# Patient Record
Sex: MALE | Race: WHITE | NOT HISPANIC OR LATINO | Employment: FULL TIME | URBAN - METROPOLITAN AREA
[De-identification: names, ages, dates, MRNs, and addresses within clinical notes are randomized per-mention and may not be internally consistent; named-entity substitution may affect disease eponyms.]

---

## 2017-05-10 ENCOUNTER — OFFICE VISIT (OUTPATIENT)
Dept: SURGERY | Facility: CLINIC | Age: 42
End: 2017-05-10
Payer: COMMERCIAL

## 2017-05-10 VITALS
OXYGEN SATURATION: 99 % | HEIGHT: 74 IN | HEART RATE: 89 BPM | SYSTOLIC BLOOD PRESSURE: 132 MMHG | WEIGHT: 250 LBS | DIASTOLIC BLOOD PRESSURE: 76 MMHG | BODY MASS INDEX: 32.08 KG/M2

## 2017-05-10 DIAGNOSIS — R22.9 LOCALIZED SUPERFICIAL SWELLING, MASS, OR LUMP: Primary | ICD-10-CM

## 2017-05-10 PROCEDURE — 99202 OFFICE O/P NEW SF 15 MIN: CPT | Performed by: SURGERY

## 2017-05-10 NOTE — MR AVS SNAPSHOT
"              After Visit Summary   5/10/2017    Wilbur Aguilar    MRN: [id number]           Patient Information     Date Of Birth          1975        Visit Information        Provider Department      5/10/2017 11:00 AM Damián Woods MD [facility] General Surgery      Today's Diagnoses     Localized superficial swelling, mass, or lump    -  1       Follow-ups after your visit        Your next 10 appointments already scheduled     May 24, 2017  9:00 AM CDT   PROCEDURE with Damián Woods MD, Laney Snowden PA-C,  Surg Cons Nurse,  SURG CONS PROCEDURE ROOM   [facility] ([facility])    [address]  [facility] [address] [phone]              Who to contact     If you have questions or need follow up information about today's clinic visit or your schedule please contact [facility] directly at [phone].  Normal or non-critical lab and imaging results will be communicated to you by Dumbstruckhart, letter or phone within 4 business days after the clinic has received the results. If you do not hear from us within 7 days, please contact the clinic through CorMatrixt or phone. If you have a critical or abnormal lab result, we will notify you by phone as soon as possible.  Submit refill requests through CHOOMOGO or call your pharmacy and they will forward the refill request to us. Please allow 3 business days for your refill to be completed.          Additional Information About Your Visit        CHOOMOGO Information     CHOOMOGO lets you send messages to your doctor, view your test results, renew your prescriptions, schedule appointments and more. To sign up, go to [email or web address] . Click on \"Log in\" on the left side of the screen, which will take you to the Welcome page. Then click on \"Sign up Now\" on the right side of the page.     You will be " "asked to enter the access code listed below, as well as some personal information. Please follow the directions to create your username and password.     Your access code is: [email or web address]  Expires: 2017  4:53 PM     Your access code will  in 90 days. If you need help or a new code, please call your [facility] or [phone].        Care EveryWhere ID     This is your Care EveryWhere ID. This could be used by other organizations to access your [place] medical records  [id number]        Your Vitals Were     Pulse Height Pulse Oximetry BMI (Body Mass Index)          89 6' 2\" (1.88 m) 99% 32.1 kg/m2         Blood Pressure from Last 3 Encounters:   05/10/17 132/76   16 112/86   14 118/70    Weight from Last 3 Encounters:   05/10/17 250 lb (113.4 kg)   16 262 lb 3.2 oz (118.9 kg)   14 252 lb 4.8 oz (114.4 kg)              Today, you had the following     No orders found for display       Primary Care Provider Office Phone # Fax #    Troy Flynn [phone]       [facility] [address] [place] [address]        Thank you!     Thank you for choosing [facility]  for your care. Our goal is always to provide you with excellent care. Hearing back from our patients is one way we can continue to improve our services. Please take a few minutes to complete the written survey that you may receive in the mail after your visit with us. Thank you!             Your Updated Medication List - Protect others around you: Learn how to safely use, store and throw away your medicines at www.disposemymeds.org.          This list is accurate as of: 5/10/17 11:59 PM.  Always use your most recent med list.                   Brand Name Dispense Instructions for use    MULTIVITAMIN TABS   OR      1 TABLET DAILY       VITAMIN E      daily         "

## 2017-05-10 NOTE — LETTER
"[facility]      Outpatient Consultation    May 10, 2017       Wilbur Aguilar MRN# [id number]   YOB: 1975 Age: 42 year old      Primary care provider: Troy Flynn     ASSESSMENT:   Wilbur Aguilar is an 42 year old year old male who I was asked to see by Lester Sandhu for scalp mass.     Progressively enlarging subcutaneous mass of scalp. Suspect sebaceous cyst.     RECOMMENDATIONS:   We discussed this. I think that this would be amenable to in office removal with local anesthesia if the patient were motivated for this. I would plan to send the lesion for testing although based on its slow growth and overall benign characteristics I certainly would not expect this to be anything concerning. He would like to move forward with this and we will schedule an office procedure for him.     Damián Woods MD  [phone] (l)     This note was created using voice recognition software. Undetected word substitutions or other errors may have occurred.        HPI:    Wilbur Aguilar is a 42 year old male who I was asked to see for a lesion on the scalp. He has noticed a slowly enlarging mass. He does not recall any drainage from it or any history of infection. It is not painful. He denies any injury to the area that he knows of. He states initially this is almost not even noticeable but now it is grown considerably, although slowly. It causes difficulty with hair cuts and grooming. There is no personal or family history of skin cancer or of sarcoma.        ALLERGIES:   No Known Allergies      REVIEW OF SYSTEMS:   10 point ROS neg other than the symptoms noted above in the HPI or here.       PHYSICAL EXAM:   /76 (Cuff Size: Adult Regular)  Pulse 89  Ht 6' 2\" (1.88 m)  Wt 250 lb (113.4 kg)  SpO2 99%  BMI 32.1 kg/m2 Estimated body mass index is 32.1 kg/(m^2) as calculated from the following:    Height as of this encounter: 6' 2\" (1.88 m).    Weight as of this " encounter: 250 lb (113.4 kg).   Constitutional: No acute distress  Eyes: Sclerae anicteric, moist conjunctivae; no lid-lag; PERRLA  ENT: Atraumatic; oropharynx clear with moist mucous membranes and no mucosal ulcerations; normal hard and soft palate  Neck: Supple neck without lymphadenopathy, no thyromegaly  Respiratory: Clear to auscultation bilaterally with normal respiratory effort  Cardiovascular: Regular rate and rhythm, no MRGs  GI: Soft, nontender, nondistended; no masses or HSM  Lymph/Hematologic: No pretibial edema  Genitourinary: Deferred  Skin: Normal temperature, turgor and texture; no rash, ulcers. Prominently on the central scalp there is an approximately 1.5 cm subcutaneous lesion which is firm but slightly compressible without any active or spontaneous drainage, cellulitis, or pain.  Musculoskeletal: Grossly symmetric and normal muscle bulk for age in all extremities; gait and station normal; no clubbing or cyanosis  Neurologic: CN II-XII grossly intact without deficits; sensation intact to light touch over all extremities  Neuropsychiatric: Appropriate affect, alert and oriented to person, place and time      Damián Woods MD

## 2017-05-23 NOTE — PROGRESS NOTES
[facility]     Outpatient Consultation     Wilbur Aguilar MRN# [id number]   YOB: 1975 Age: 42 year old     Primary care provider: Troy Flynn    ASSESSMENT:   Wilbur Aguilar is an 42 year old year old male who I was asked to see by Lester Sandhu for scalp mass.    Progressively enlarging subcutaneous mass of scalp. Suspect sebaceous cyst.    RECOMMENDATIONS:   We discussed this. I think that this would be amenable to in office removal with local anesthesia if the patient were motivated for this. I would plan to send the lesion for testing although based on its slow growth and overall benign characteristics I certainly would not expect this to be anything concerning. He would like to move forward with this and we will schedule an office procedure for him.    Damián Woods MD  [phone] (n)    This note was created using voice recognition software. Undetected word substitutions or other errors may have occurred.      HPI:    Wilbur Aguilar is a 42 year old male who I was asked to see for a lesion on the scalp. He has noticed a slowly enlarging mass. He does not recall any drainage from it or any history of infection. It is not painful. He denies any injury to the area that he knows of. He states initially this is almost not even noticeable but now it is grown considerably, although slowly. It causes difficulty with hair cuts and grooming. There is no personal or family history of skin cancer or of sarcoma.          PAST MEDICAL HISTORY:   History reviewed. No pertinent past medical history.     PAST SURGICAL HISTORY:     Past Surgical History:   Procedure Laterality Date     C NONSPECIFIC PROCEDURE      T + A as child        SOCIAL HISTORY:     Social History     Social History     Marital status:      Spouse name: Viry     Number of children: 3     Years of education: N/A     Occupational History      [employer]     Social History Main Topics  "    Smoking status: Former Smoker     Quit date: 1/1/2001     Smokeless tobacco: None      Comment: chews 1-2 cans every 3 days     Alcohol use Yes      Comment: rarely     Drug use: No     Sexual activity: Yes     Partners: Female     Other Topics Concern     None     Social History Narrative        FAMILY HISTORY:     Family History   Problem Relation Age of Onset     Breast Cancer Mother      CANCER Mother      lymphoma     Other Cancer Mother      DIABETES Father      adult onset type 2     CANCER Paternal Grandfather      Other Cancer Paternal Grandfather      DIABETES Paternal Grandmother      Hypertension Maternal Grandmother      Other Cancer Maternal Grandfather        MEDICATIONS:     Current Outpatient Prescriptions   Medication Sig Dispense Refill     MULTIVITAMIN TABS   OR 1 TABLET DAILY       VITAMIN E daily          ALLERGIES:   No Known Allergies     REVIEW OF SYSTEMS:   10 point ROS neg other than the symptoms noted above in the HPI or here.      PHYSICAL EXAM:   /76 (Cuff Size: Adult Regular)  Pulse 89  Ht 6' 2\" (1.88 m)  Wt 250 lb (113.4 kg)  SpO2 99%  BMI 32.1 kg/m2 Estimated body mass index is 32.1 kg/(m^2) as calculated from the following:    Height as of this encounter: 6' 2\" (1.88 m).    Weight as of this encounter: 250 lb (113.4 kg).   Constitutional: No acute distress  Eyes: Sclerae anicteric, moist conjunctivae; no lid-lag; PERRLA  ENT: Atraumatic; oropharynx clear with moist mucous membranes and no mucosal ulcerations; normal hard and soft palate  Neck: Supple neck without lymphadenopathy, no thyromegaly  Respiratory: Clear to auscultation bilaterally with normal respiratory effort  Cardiovascular: Regular rate and rhythm, no MRGs  GI: Soft, nontender, nondistended; no masses or HSM  Lymph/Hematologic: No pretibial edema  Genitourinary: Deferred  Skin: Normal temperature, turgor and texture; no rash, ulcers. Prominently on the central scalp there is an approximately 1.5 cm " subcutaneous lesion which is firm but slightly compressible without any active or spontaneous drainage, cellulitis, or pain.  Musculoskeletal: Grossly symmetric and normal muscle bulk for age in all extremities; gait and station normal; no clubbing or cyanosis  Neurologic: CN II-XII grossly intact without deficits; sensation intact to light touch over all extremities  Neuropsychiatric: Appropriate affect, alert and oriented to person, place and time     LABORATORY AND IMAGING:      Results for orders placed or performed in visit on 03/05/14   Lipid panel reflex to direct LDL   Result Value Ref Range    Cholesterol 129 <200 mg/dL    Triglycerides 64 0 - 150 mg/dL    HDL Cholesterol 41 >40 mg/dL    LDL Cholesterol Calculated 74 0 - 129 mg/dL    VLDL-Cholesterol 13 0 - 30 mg/dL    Cholesterol/HDL Ratio 3.1 0.0 - 5.0   Glucose   Result Value Ref Range    Glucose 101 (H) 60 - 99 mg/dL         30 minutes were spent in this encounter, over 50% in counseling and coordination of care.    Please route or send letter to:  Referring Provider

## 2017-05-24 ENCOUNTER — HOSPITAL ENCOUNTER (OUTPATIENT)
Dept: LAB | Facility: CLINIC | Age: 42
Discharge: HOME | End: 2017-05-24
Attending: SURGERY | Admitting: SURGERY
Payer: COMMERCIAL

## 2017-05-24 ENCOUNTER — OFFICE VISIT (OUTPATIENT)
Dept: SURGERY | Facility: CLINIC | Age: 42
End: 2017-05-24
Payer: COMMERCIAL

## 2017-05-24 DIAGNOSIS — L72.9 SCALP CYST: ICD-10-CM

## 2017-05-24 DIAGNOSIS — L72.9 SCALP CYST: Primary | ICD-10-CM

## 2017-05-24 PROCEDURE — 88304 TISSUE EXAM BY PATHOLOGIST: CPT | Performed by: SURGERY

## 2017-05-24 PROCEDURE — 88304 TISSUE EXAM BY PATHOLOGIST: CPT | Mod: 26 | Performed by: SURGERY

## 2017-05-24 PROCEDURE — 11422 EXC H-F-NK-SP B9+MARG 1.1-2: CPT | Performed by: SURGERY

## 2017-05-24 NOTE — PROGRESS NOTES
Procedure  Removal scalp cyst    After informed consent was obtained, the area of the scalp mass at the vertex was shaved and was then sterilely prepped. Gloves, masks, and a fenestrated drape were used. A transverse line overlying the mass was injected using 0.5% bupivacaine and this was then sharply opened to reveal a typical appearing sebaceous cyst. It was circumferentially dissected sharply. It was then delivered from the incision and handed off as specimen. There is a small amount of cyst wall that was adherent that had become  on the underside of one of the skin flaps and this was dissected free and was sent with the specimen. At each corner of the incision at the base there was a small area arterial bleeding. This was incompletely controlled with battery-powered cautery and so we used Hyfrecator for control. The incision was then washed and dried. It was closed with multiple interrupted 3-0 Vicryl deep dermal sutures followed by 4-0 Vicryl subcuticular stitch. Skin adhesive was applied. The patient tolerated this well. I was assisted by Laney Snowden PA-C.

## 2017-05-24 NOTE — MR AVS SNAPSHOT
"              After Visit Summary   5/24/2017    Wilbur Aguilar    MRN: [id number]           Patient Information     Date Of Birth          1975        Visit Information        Provider Department      5/24/2017 9:00 AM Nurse,  Surg Cons; Laney Snowden PA-C; Damián Woods MD; [facility] SURG CONS PROCEDURE ROOM [facility] General Surgery      Today's Diagnoses     Scalp cyst    -  1       Follow-ups after your visit        Future tests that were ordered for you today     Open Future Orders        Priority Expected Expires Ordered    Surgical pathology exam Routine 5/24/2017 5/25/2017 5/24/2017            Who to contact     If you have questions or need follow up information about today's clinic visit or your schedule please contact [facility] directly at [phone].  Normal or non-critical lab and imaging results will be communicated to you by Wavo.mehart, letter or phone within 4 business days after the clinic has received the results. If you do not hear from us within 7 days, please contact the clinic through [email or web address] or phone. If you have a critical or abnormal lab result, we will notify you by phone as soon as possible.  Submit refill requests through PassportParking or call your pharmacy and they will forward the refill request to us. Please allow 3 business days for your refill to be completed.          Additional Information About Your Visit        Wavo.mehart Information     PassportParking lets you send messages to your doctor, view your test results, renew your prescriptions, schedule appointments and more. To sign up, go to [email or web address] . Click on \"Log in\" on the left side of the screen, which will take you to the Welcome page. Then click on \"Sign up Now\" on the right side of the page.     You will be asked to enter the access code listed below, as well as some personal information. Please follow the directions to create your " username and password.     Your access code is: [email or web address]  Expires: 2017  4:53 PM     Your access code will  in 90 days. If you need help or a new code, please call your [facility] or [phone].        Care EveryWhere ID     This is your Care EveryWhere ID. This could be used by other organizations to access your [place] medical records  [id number]         Blood Pressure from Last 3 Encounters:   05/10/17 132/76   16 112/86   14 118/70    Weight from Last 3 Encounters:   05/10/17 250 lb (113.4 kg)   16 262 lb 3.2 oz (118.9 kg)   14 252 lb 4.8 oz (114.4 kg)               Primary Care Provider Office Phone # Fax #    Troy Flynn [phone]       [facility] [address] [place] [address]        Thank you!     Thank you for choosing [facility]  for your care. Our goal is always to provide you with excellent care. Hearing back from our patients is one way we can continue to improve our services. Please take a few minutes to complete the written survey that you may receive in the mail after your visit with us. Thank you!             Your Updated Medication List - Protect others around you: Learn how to safely use, store and throw away your medicines at www.disposemymeds.org.          This list is accurate as of: 17  2:26 PM.  Always use your most recent med list.                   Brand Name Dispense Instructions for use    MULTIVITAMIN TABS   OR      1 TABLET DAILY       VITAMIN E      daily

## 2017-05-25 LAB — COPATH REPORT: NORMAL

## 2019-05-17 NOTE — PATIENT INSTRUCTIONS
Stop your fish oil one week out from surgery (TOMORROW)      Before Your Surgery      Call your surgeon if there is any change in your health. This includes signs of a cold or flu (such as a sore throat, runny nose, cough, rash or fever).    Do not smoke, drink alcohol or take over the counter medicine (unless your surgeon or primary care doctor tells you to) for the 24 hours before and after surgery.    If you take prescribed drugs: Follow your doctor s orders about which medicines to take and which to stop until after surgery.    Eating and drinking prior to surgery: follow the instructions from your surgeon    Take a shower or bath the night before surgery. Use the soap your surgeon gave you to gently clean your skin. If you do not have soap from your surgeon, use your regular soap. Do not shave or scrub the surgery site.  Wear clean pajamas and have clean sheets on your bed.

## 2019-05-17 NOTE — PROGRESS NOTES
[facility]  [id number] [phone]  Dept: [phone]    PRE-OP EVALUATION:  Today's date: 2019    Wilbur Aguilar (: 1975) presents for pre-operative evaluation assessment as requested by Dr. Sina Lomax. He requires evaluation and anesthesia risk assessment prior to undergoing surgery/procedure for treatment of Left Knee Partial Medical Meniscectomy.    Fax number for surgical facility: [phone]  Primary Physician: [facility]  Type of Anesthesia Anticipated: General    Patient has a Health Care Directive or Living Will:  NO    Preop Questions 2019   Who is doing your surgery? Dr Sina Lomax    What are you having done? Left knee scope; left medial meniscus tear   Date of Surgery/Procedure: 19   Facility or Hospital where procedure/surgery will be performed: [facility]    1.  Do you have a history of Heart attack, stroke, stent, coronary bypass surgery, or other heart surgery? No   2.  Do you ever have any pain or discomfort in your chest? No   3.  Do you have a history of  Heart Failure? No   4.   Are you troubled by shortness of breath when:  walking on a level surface, or up a slight hill, or at night? No   5.  Do you currently have a cold, bronchitis or other respiratory infection? No   6.  Do you have a cough, shortness of breath, or wheezing? No   7.  Do you sometimes get pains in the calves of your legs when you walk? No   8. Do you or anyone in your family have previous history of blood clots? No   9.  Do you or does anyone in your family have a serious bleeding problem such as prolonged bleeding following surgeries or cuts? No   10. Have you ever had problems with anemia or been told to take iron pills? No   11. Have you had any abnormal blood loss such as black, tarry or bloody stools? No   12. Have you ever had a blood transfusion? No   13. Have you or any of your relatives ever had  problems with anesthesia? No   14. Do you have sleep apnea, excessive snoring or daytime drowsiness? No   15. Do you have any prosthetic heart valves? No   16. Do you have prosthetic joints? No         HPI:     HPI related to upcoming procedure: Patient is a 43yo male who noted a twist injury while working out and tore partial left meniscus. Happened in April. Did have some significant swelling. MRI showed partial tear of medial meniscus.      See problem list for active medical problems.  Problems all longstanding and stable, except as noted/documented.  See ROS for pertinent symptoms related to these conditions.                                                                                                                                                          .    MEDICAL HISTORY:     Patient Active Problem List    Diagnosis Date Noted     CARDIOVASCULAR SCREENING; LDL GOAL LESS THAN 160 10/31/2010     Priority: Medium      History reviewed. No pertinent past medical history.  Past Surgical History:   Procedure Laterality Date     C NONSPECIFIC PROCEDURE      T + A as child      Current Outpatient Medications   Medication Sig Dispense Refill     MULTIVITAMIN TABS   OR 1 TABLET DAILY       Omega-3 Fatty Acids (FISH OIL PO)        Probiotic Product (PROBIOTIC PO)        VITAMIN E daily       OTC products: MV, Fish oil and probiotic     No Known Allergies   Latex Allergy: NO    Social History     Tobacco Use     Smoking status: Former Smoker     Last attempt to quit: 2001     Years since quittin.3     Smokeless tobacco: Former User     Tobacco comment: chews 1-2 cans every 3 days   Substance Use Topics     Alcohol use: Yes     Comment: rarely     History   Drug Use No       REVIEW OF SYSTEMS:   Constitutional, neuro, ENT, endocrine, pulmonary, cardiac, gastrointestinal, genitourinary, musculoskeletal, integument and psychiatric systems are negative, except as otherwise noted.    EXAM:   /81   Pulse  "94   Temp 97  F (36.1  C) (Tympanic)   Resp 18   Ht 1.88 m (6' 2\")   Wt 113.2 kg (249 lb 8 oz)   SpO2 95%   BMI 32.03 kg/m      GENERAL APPEARANCE: healthy, alert and no distress     EYES: EOMI,  PERRL     HENT: ear canals and TM's normal and nose and mouth without ulcers or lesions     RESP: lungs clear to auscultation - no rales, rhonchi or wheezes     CV: regular rates and rhythm, normal S1 S2, no S3 or S4 and no murmur, click or rub     ABDOMEN:  soft, nontender, no HSM or masses and bowel sounds normal     MS: extremities normal- no gross deformities noted, no evidence of inflammation in joints, FROM in all extremities.     SKIN: no suspicious lesions or rashes     NEURO: Normal strength and tone, sensory exam grossly normal, mentation intact and speech normal     PSYCH: mentation appears normal. and affect normal/bright    DIAGNOSTICS:   No labs or EKG required for low risk surgery and NOT ON CHRONIC MEDICATIONS    IMPRESSION:   Reason for surgery/procedure: Medical meniscus tear, left  Diagnosis/reason for consult: Pre-operative consult    The proposed surgical procedure is considered LOW risk.    REVISED CARDIAC RISK INDEX  The patient has the following serious cardiovascular risks for perioperative complications such as (MI, PE, VFib and 3  AV Block):  No serious cardiac risks  INTERPRETATION: 0 risks: Class I (very low risk - 0.4% complication rate)    The patient has the following additional risks for perioperative complications:  No identified additional risks      ICD-10-CM    1. Preop general physical exam Z01.818    2. Tear of medial meniscus of left knee, current, unspecified tear type, initial encounter S83.242A        RECOMMENDATIONS:     --Consult hospital rounder / IM to assist post-op medical management    --Patient is on no chronic medications    --Will stop FISH OIL    APPROVAL GIVEN to proceed with proposed procedure, without further diagnostic evaluation       Signed Electronically by: " Lester Sandhu PA-C    Copy of this evaluation report is provided to requesting physician.    [place] Preop Guidelines    Revised Cardiac Risk Index

## 2019-05-21 ENCOUNTER — OFFICE VISIT (OUTPATIENT)
Dept: FAMILY MEDICINE | Facility: CLINIC | Age: 44
End: 2019-05-21
Payer: COMMERCIAL

## 2019-05-21 VITALS
BODY MASS INDEX: 32.02 KG/M2 | TEMPERATURE: 97 F | RESPIRATION RATE: 18 BRPM | OXYGEN SATURATION: 95 % | HEIGHT: 74 IN | DIASTOLIC BLOOD PRESSURE: 81 MMHG | WEIGHT: 249.5 LBS | SYSTOLIC BLOOD PRESSURE: 120 MMHG | HEART RATE: 94 BPM

## 2019-05-21 DIAGNOSIS — Z01.818 PREOP GENERAL PHYSICAL EXAM: Primary | ICD-10-CM

## 2019-05-21 DIAGNOSIS — S83.242A TEAR OF MEDIAL MENISCUS OF LEFT KNEE, CURRENT, UNSPECIFIED TEAR TYPE, INITIAL ENCOUNTER: ICD-10-CM

## 2019-05-21 PROCEDURE — 99214 OFFICE O/P EST MOD 30 MIN: CPT | Performed by: PHYSICIAN ASSISTANT

## 2019-05-21 ASSESSMENT — MIFFLIN-ST. JEOR: SCORE: 2081.47

## 2020-04-06 ENCOUNTER — TELEPHONE (OUTPATIENT)
Dept: FAMILY MEDICINE | Facility: CLINIC | Age: 45
End: 2020-04-06

## 2020-04-06 NOTE — TELEPHONE ENCOUNTER
Wife calls.  Pt has a rash on his ribs and side and wraps around to his back.  It is 6 cm long.  It has pus filled blisters.  Last night he got out of the shower and the area itches.  It does smart a little today.  Pt is at work today.      Advised he should have some kind of visit.  Transferred wife to scheduling so he can get set up for [employer] and do an e-visit and submit a photo.  Advised on how to do an e-visit.

## 2021-01-14 NOTE — PROGRESS NOTES
"Assessment and Plan    (B07.8) Common wart  (primary encounter diagnosis)  Comment: Verbal consent obtained.  Wart(s) debrided with #10 scalpel.  Cryo treatment x3 with 15sec thaw.  Patient tolerated procedure well.    Plan: DESTRUCT BENIGN LESION, UP TO 14            (Z23) Need for Tdap vaccination  Comment:   Plan: TD PRESERV FREE, IM (7+ YRS), ADMIN 1st VACCINE              RTC in 2w    Adriano Slater MD        Subjective     Wilbur is a 46 year old who presents to clinic today for the following health issues     HPI       Warts  Onset/Duration: almost 3 years   Description (location/number): left thumb   Accompanying signs and symptoms (pain, redness):  no   History: prior warts:  no   Therapies tried and outcome: compound W-seems to be helping a little bit.     Limited to pad of L thumb.  Tend to crack and bleed.  No previous issues with them.  Has been trying Compound W, not really helping.          Review of Systems   Constitutional: Negative.    Skin:        warts            Objective    /88 (BP Location: Right arm, Patient Position: Sitting, Cuff Size: Adult Regular)   Pulse 96   Temp 98.4  F (36.9  C) (Oral)   Resp 20   Ht 1.886 m (6' 2.25\")   Wt 119.2 kg (262 lb 12.8 oz)   BMI 33.51 kg/m    Body mass index is 33.51 kg/m .  Physical Exam  Nursing note reviewed.   Constitutional:       Appearance: Normal appearance.   Musculoskeletal:        Hands:    Neurological:      Mental Status: He is alert.                 "

## 2021-01-15 ENCOUNTER — OFFICE VISIT (OUTPATIENT)
Dept: FAMILY MEDICINE | Facility: CLINIC | Age: 46
End: 2021-01-15
Payer: COMMERCIAL

## 2021-01-15 VITALS
BODY MASS INDEX: 33.73 KG/M2 | DIASTOLIC BLOOD PRESSURE: 88 MMHG | WEIGHT: 262.8 LBS | HEIGHT: 74 IN | RESPIRATION RATE: 20 BRPM | TEMPERATURE: 98.4 F | SYSTOLIC BLOOD PRESSURE: 138 MMHG | HEART RATE: 96 BPM

## 2021-01-15 DIAGNOSIS — Z23 NEED FOR TDAP VACCINATION: ICD-10-CM

## 2021-01-15 DIAGNOSIS — B07.8 COMMON WART: Primary | ICD-10-CM

## 2021-01-15 PROCEDURE — 17110 DESTRUCTION B9 LES UP TO 14: CPT | Performed by: FAMILY MEDICINE

## 2021-01-15 PROCEDURE — 90471 IMMUNIZATION ADMIN: CPT | Performed by: FAMILY MEDICINE

## 2021-01-15 PROCEDURE — 90714 TD VACC NO PRESV 7 YRS+ IM: CPT | Performed by: FAMILY MEDICINE

## 2021-01-15 ASSESSMENT — ENCOUNTER SYMPTOMS
ROS SKIN COMMENTS: WARTS
CONSTITUTIONAL NEGATIVE: 1

## 2021-01-15 ASSESSMENT — MIFFLIN-ST. JEOR: SCORE: 2135.77

## 2021-01-15 NOTE — PROGRESS NOTES
Prior to immunization administration, verified patients identity using patient s name and date of birth. Please see Immunization Activity for additional information.     Screening Questionnaire for Adult Immunization    Are you sick today?   No   Do you have allergies to medications, food, a vaccine component or latex?   No   Have you ever had a serious reaction after receiving a vaccination?   No   Do you have a long-term health problem with heart, lung, kidney, or metabolic disease (e.g., diabetes), asthma, a blood disorder, no spleen, complement component deficiency, a cochlear implant, or a spinal fluid leak?  Are you on long-term aspirin therapy?   No   Do you have cancer, leukemia, HIV/AIDS, or any other immune system problem?   No   Do you have a parent, brother, or sister with an immune system problem?   No   In the past 3 months, have you taken medications that affect  your immune system, such as prednisone, other steroids, or anticancer drugs; drugs for the treatment of rheumatoid arthritis, Crohn s disease, or psoriasis; or have you had radiation treatments?   No   Have you had a seizure, or a brain or other nervous system problem?   No   During the past year, have you received a transfusion of blood or blood    products, or been given immune (gamma) globulin or antiviral drug?   No   For women: Are you pregnant or is there a chance you could become       pregnant during the next month?   No   Have you received any vaccinations in the past 4 weeks?   No     Immunization questionnaire answers were all negative.        Per orders of Dr. Slater, injection of Td given by Lisa A. Magill, CMA. Patient instructed to remain in clinic for 15 minutes afterwards, and to report any adverse reaction to me immediately.       Screening performed by Lisa A. Magill, CMA on 1/15/2021 at 9:43 AM.

## 2021-01-15 NOTE — NURSING NOTE
"Chief Complaint   Patient presents with     Wart     Initial /88 (BP Location: Right arm, Patient Position: Sitting, Cuff Size: Adult Regular)   Pulse 96   Temp 98.4  F (36.9  C) (Oral)   Resp 20   Ht 1.886 m (6' 2.25\")   Wt 119.2 kg (262 lb 12.8 oz)   BMI 33.51 kg/m   Estimated body mass index is 33.51 kg/m  as calculated from the following:    Height as of this encounter: 1.886 m (6' 2.25\").    Weight as of this encounter: 119.2 kg (262 lb 12.8 oz).  BP completed using cuff size regular long right arm    Lisa Magill, CMA    "

## 2021-01-28 ENCOUNTER — OFFICE VISIT (OUTPATIENT)
Dept: FAMILY MEDICINE | Facility: CLINIC | Age: 46
End: 2021-01-28
Payer: COMMERCIAL

## 2021-01-28 VITALS
RESPIRATION RATE: 16 BRPM | BODY MASS INDEX: 33.29 KG/M2 | SYSTOLIC BLOOD PRESSURE: 116 MMHG | DIASTOLIC BLOOD PRESSURE: 84 MMHG | WEIGHT: 261 LBS | HEART RATE: 88 BPM | OXYGEN SATURATION: 95 % | TEMPERATURE: 98.1 F

## 2021-01-28 DIAGNOSIS — B07.8 COMMON WART: Primary | ICD-10-CM

## 2021-01-28 PROCEDURE — 17110 DESTRUCTION B9 LES UP TO 14: CPT | Performed by: FAMILY MEDICINE

## 2021-01-28 ASSESSMENT — ENCOUNTER SYMPTOMS
CONSTITUTIONAL NEGATIVE: 1
ROS SKIN COMMENTS: WARTS

## 2021-01-28 NOTE — PROGRESS NOTES
Assessment and Plan    (B07.8) Common wart  (primary encounter diagnosis)  Comment: Verbal consent obtained.  Wart(s) debrided with #15 scalpel.  Cryo treatment x3 with 15sec thaw.  Patient tolerated procedure well.    Plan: DESTRUCT BENIGN LESION, UP TO 14              RTC in 2w    Adriano Slater MD        Jaimie Bowles is a 46 year old who presents to clinic today for the following health issues     HPI       Warts  Onset/Duration: almost 3 years  Description (location/number): left thumb  Accompanying signs and symptoms (pain, redness): feels like it did when it first started  History: prior warts: no  Therapies tried and outcome: liquid nitrogen and Compound W          Previously treated 1/15/21, feels it's getting better.     Review of Systems   Constitutional: Negative.    Skin:        warts            Objective    /84 (BP Location: Right arm, Patient Position: Sitting, Cuff Size: Adult Large)   Pulse 88   Temp 98.1  F (36.7  C) (Oral)   Resp 16   Wt 118.4 kg (261 lb)   SpO2 95%   BMI 33.29 kg/m    Body mass index is 33.29 kg/m .  Physical Exam

## 2021-02-05 ENCOUNTER — OFFICE VISIT (OUTPATIENT)
Dept: FAMILY MEDICINE | Facility: CLINIC | Age: 46
End: 2021-02-05
Payer: COMMERCIAL

## 2021-02-05 VITALS
SYSTOLIC BLOOD PRESSURE: 116 MMHG | DIASTOLIC BLOOD PRESSURE: 82 MMHG | OXYGEN SATURATION: 97 % | TEMPERATURE: 98.2 F | BODY MASS INDEX: 33.88 KG/M2 | HEIGHT: 74 IN | WEIGHT: 264 LBS | HEART RATE: 86 BPM | RESPIRATION RATE: 16 BRPM

## 2021-02-05 DIAGNOSIS — Z11.59 NEED FOR HEPATITIS C SCREENING TEST: ICD-10-CM

## 2021-02-05 DIAGNOSIS — Z00.00 ROUTINE GENERAL MEDICAL EXAMINATION AT A HEALTH CARE FACILITY: Primary | ICD-10-CM

## 2021-02-05 LAB
ANION GAP SERPL CALCULATED.3IONS-SCNC: 4 MMOL/L (ref 3–14)
BUN SERPL-MCNC: 19 MG/DL (ref 7–30)
CALCIUM SERPL-MCNC: 8.9 MG/DL (ref 8.5–10.1)
CHLORIDE SERPL-SCNC: 109 MMOL/L (ref 94–109)
CHOLEST SERPL-MCNC: 147 MG/DL
CO2 SERPL-SCNC: 27 MMOL/L (ref 20–32)
CREAT SERPL-MCNC: 0.94 MG/DL (ref 0.66–1.25)
GFR SERPL CREATININE-BSD FRML MDRD: >90 ML/MIN/{1.73_M2}
GLUCOSE SERPL-MCNC: 102 MG/DL (ref 70–99)
HCV AB SERPL QL IA: NONREACTIVE
HDLC SERPL-MCNC: 54 MG/DL
LDLC SERPL CALC-MCNC: 74 MG/DL
NONHDLC SERPL-MCNC: 93 MG/DL
POTASSIUM SERPL-SCNC: 4.4 MMOL/L (ref 3.4–5.3)
SODIUM SERPL-SCNC: 140 MMOL/L (ref 133–144)
TRIGL SERPL-MCNC: 97 MG/DL

## 2021-02-05 PROCEDURE — 99396 PREV VISIT EST AGE 40-64: CPT | Performed by: FAMILY MEDICINE

## 2021-02-05 PROCEDURE — 36415 COLL VENOUS BLD VENIPUNCTURE: CPT | Performed by: FAMILY MEDICINE

## 2021-02-05 PROCEDURE — 80061 LIPID PANEL: CPT | Performed by: FAMILY MEDICINE

## 2021-02-05 PROCEDURE — 86803 HEPATITIS C AB TEST: CPT | Performed by: FAMILY MEDICINE

## 2021-02-05 PROCEDURE — 80048 BASIC METABOLIC PNL TOTAL CA: CPT | Performed by: FAMILY MEDICINE

## 2021-02-05 ASSESSMENT — ENCOUNTER SYMPTOMS
HEMATURIA: 0
PALPITATIONS: 0
CONSTIPATION: 0
ARTHRALGIAS: 0
HEMATOCHEZIA: 0
SORE THROAT: 0
FEVER: 0
WEAKNESS: 0
COUGH: 0
NAUSEA: 0
DYSURIA: 0
MYALGIAS: 0
EYE PAIN: 0
PARESTHESIAS: 0
NERVOUS/ANXIOUS: 0
ABDOMINAL PAIN: 0
DIZZINESS: 0
CHILLS: 0
SHORTNESS OF BREATH: 0
HEADACHES: 0
JOINT SWELLING: 0
HEARTBURN: 0
FREQUENCY: 0
DIARRHEA: 0

## 2021-02-05 ASSESSMENT — MIFFLIN-ST. JEOR: SCORE: 2137.25

## 2021-02-05 NOTE — PATIENT INSTRUCTIONS
Preventive Health Recommendations  Male Ages 40 to 49    Yearly exam:             See your health care provider every year in order to  o   Review health changes.   o   Discuss preventive care.    o   Review your medicines if your doctor has prescribed any.    You should be tested each year for STDs (sexually transmitted diseases) if you re at risk.     Have a cholesterol test every 5 years.     Have a colonoscopy (test for colon cancer) if someone in your family has had colon cancer or polyps before age 50.     After age 45, have a diabetes test (fasting glucose). If you are at risk for diabetes, you should have this test every 3 years.      Talk with your health care provider about whether or not a prostate cancer screening test (PSA) is right for you.    Shots: Get a flu shot each year. Get a tetanus shot every 10 years.     Nutrition:    Eat at least 5 servings of fruits and vegetables daily.     Eat whole-grain bread, whole-wheat pasta and brown rice instead of white grains and rice.     Get adequate Calcium and Vitamin D.     Lifestyle    Exercise for at least 150 minutes a week (30 minutes a day, 5 days a week). This will help you control your weight and prevent disease.     Limit alcohol to one drink per day.     No smoking.     Wear sunscreen to prevent skin cancer.     See your dentist every six months for an exam and cleaning.

## 2021-02-05 NOTE — PROGRESS NOTES
SUBJECTIVE:   CC: Wilbur Aguilar is an 46 year old male who presents for preventative health visit.       Patient has been advised of split billing requirements and indicates understanding: Yes  Healthy Habits:     Getting at least 3 servings of Calcium per day:  Yes    Bi-annual eye exam:  Yes    Dental care twice a year:  Yes    Sleep apnea or symptoms of sleep apnea:  Excessive snoring    Diet:  Regular (no restrictions)    Frequency of exercise:  2-3 days/week    Duration of exercise:  15-30 minutes    Taking medications regularly:  Yes    Medication side effects:  None    PHQ-2 Total Score: 0    Additional concerns today:  No    Has been seen recently for wart on his thumb, feels this is essentially gone.      Is fasting.      Feeling well, no acute concerns.    Works at [employer], frozen/.    Today's PHQ-2 Score:   PHQ-2 (  [employer]) 2021   Q1: Little interest or pleasure in doing things 0   Q2: Feeling down, depressed or hopeless 0   PHQ-2 Score 0   Q1: Little interest or pleasure in doing things Not at all   Q2: Feeling down, depressed or hopeless Not at all   PHQ-2 Score 0       Abuse: Current or Past(Physical, Sexual or Emotional)- No  Do you feel safe in your environment? Yes        Social History     Tobacco Use     Smoking status: Former Smoker     Quit date: 2001     Years since quittin.1     Smokeless tobacco: Former User     Tobacco comment: chews 1-2 cans every 3 days   Substance Use Topics     Alcohol use: Yes     Comment: rarely     If you drink alcohol do you typically have >3 drinks per day or >7 drinks per week? No    Alcohol Use 2021   Prescreen: >3 drinks/day or >7 drinks/week? No   Prescreen: >3 drinks/day or >7 drinks/week? -       Last PSA: No results found for: PSA    Reviewed orders with patient. Reviewed health maintenance and updated orders accordingly - Yes  Lab work is in process  Labs reviewed in EPIC    Reviewed and updated as needed this visit by  "clinical staff  Tobacco  Allergies  Meds   Med Hx  Surg Hx  Fam Hx  Soc Hx        Reviewed and updated as needed this visit by Provider  Tobacco                   Review of Systems   Constitutional: Negative for chills and fever.   HENT: Negative for congestion, ear pain, hearing loss and sore throat.    Eyes: Negative for pain and visual disturbance.   Respiratory: Negative for cough and shortness of breath.    Cardiovascular: Negative for chest pain, palpitations and peripheral edema.   Gastrointestinal: Negative for abdominal pain, constipation, diarrhea, heartburn, hematochezia and nausea.   Genitourinary: Negative for discharge, dysuria, frequency, genital sores, hematuria, impotence and urgency.   Musculoskeletal: Negative for arthralgias, joint swelling and myalgias.   Skin: Negative for rash.   Neurological: Negative for dizziness, weakness, headaches and paresthesias.   Psychiatric/Behavioral: Negative for mood changes. The patient is not nervous/anxious.          OBJECTIVE:   /82 (BP Location: Right arm, Patient Position: Sitting, Cuff Size: Adult Large)   Pulse 86   Temp 98.2  F (36.8  C) (Oral)   Resp 16   Ht 1.88 m (6' 2\")   Wt 119.7 kg (264 lb)   SpO2 97%   BMI 33.90 kg/m      Physical Exam  Vitals signs and nursing note reviewed.   Constitutional:       Appearance: He is well-developed.   HENT:      Head: Normocephalic and atraumatic.      Right Ear: Tympanic membrane, ear canal and external ear normal.      Left Ear: Tympanic membrane, ear canal and external ear normal.   Eyes:      Pupils: Pupils are equal, round, and reactive to light.   Neck:      Thyroid: No thyromegaly.   Cardiovascular:      Rate and Rhythm: Normal rate and regular rhythm.      Heart sounds: Normal heart sounds.   Pulmonary:      Effort: Pulmonary effort is normal.      Breath sounds: Normal breath sounds.   Abdominal:      General: Bowel sounds are normal.      Palpations: Abdomen is soft. There is no mass. " "  Musculoskeletal: Normal range of motion.   Lymphadenopathy:      Cervical: No cervical adenopathy.   Skin:     General: Skin is warm and dry.      Findings: No rash.   Neurological:      Mental Status: He is alert and oriented to person, place, and time.   Psychiatric:         Judgment: Judgment normal.           Diagnostic Test Results:  Labs reviewed in Epic    ASSESSMENT/PLAN:       ICD-10-CM    1. Routine general medical examination at a health care facility  Z00.00 Lipid panel reflex to direct LDL Fasting     Basic metabolic panel  (Ca, Cl, CO2, Creat, Gluc, K, Na, BUN)   2. Need for hepatitis C screening test  Z11.59 Hepatitis C Screen Reflex to HCV RNA Quant and Genotype       Patient has been advised of split billing requirements and indicates understanding: Yes  COUNSELING:   Reviewed preventive health counseling, as reflected in patient instructions       Regular exercise       Healthy diet/nutrition       Vision screening    Estimated body mass index is 33.9 kg/m  as calculated from the following:    Height as of this encounter: 1.88 m (6' 2\").    Weight as of this encounter: 119.7 kg (264 lb).     Weight management plan: Discussed healthy diet and exercise guidelines    He reports that he quit smoking about 20 years ago. He has quit using smokeless tobacco.      Counseling Resources:  ATP IV Guidelines  Pooled Cohorts Equation Calculator  FRAX Risk Assessment  ICSI Preventive Guidelines  Dietary Guidelines for Americans, 2010  USDA's MyPlate  ASA Prophylaxis  Lung CA Screening    Adriano Slater MD  [facility]  "

## 2021-02-05 NOTE — PROGRESS NOTES
"SUBJECTIVE:   Wilbur Aguilar is a 46 year old male who presents for Preventive Visit.    {Split Bill scripting  The purpose of this visit is to discuss your medical history and prevent health problems before you are sick. You may be responsible for a co-pay, coinsurance, or deductible if your visit today includes services such as checking on a sore throat, having an x-ray or lab test, or treating and evaluating a new or existing condition :708185}  Patient has been advised of split billing requirements and indicates understanding: {Yes and No:297764}   Are you in the first 12 months of your Medicare coverage?  { :462955::\"No\"}    HPI  Do you feel safe in your environment? { :851682}    Have you ever done Advance Care Planning? (For example, a Health Directive, POLST, or a discussion with a medical provider or your loved ones about your wishes): { :526719}    {Hearing Test Done (Optional):834658}   Fall risk  { :207659}  {If any of the above assessments are answered yes, consider ordering appropriate referrals (Optional):743754::\"click delete button to remove this line now\"}  Cognitive Screening { :604031}    {Do you have sleep apnea, excessive snoring or daytime drowsiness? (Optional):441250}    Reviewed and updated as needed this visit by clinical staff                 Reviewed and updated as needed this visit by Provider                Social History     Tobacco Use     Smoking status: Former Smoker     Quit date: 2001     Years since quittin.1     Smokeless tobacco: Former User     Tobacco comment: chews 1-2 cans every 3 days   Substance Use Topics     Alcohol use: Yes     Comment: rarely     {Rooming Staff- Complete this question if Prescreen response is not shown below for today's visit. If you drink alcohol do you typically have >3 drinks per day or >7 drinks per week? (Optional):446435}    No flowsheet data found.{add AUDIT responses (Optional) (A score of 7 for adult men is an indication of " "hazardous drinking; a score of 8 or more is an indication of an alcohol use disorder.  A score of 7 or more for adult women is an indication of hazardous drinking or an alchohol use disorder):618818}    {Outside tests to abstract? :128744}    {additional problems to add (Optional):312928}    Current providers sharing in care for this patient include: {Rooming staff:  Please update Care Team in Rooming Activity, refresh this note and then delete this statement}  Patient Care Team:  Maci Cruz as PCP - Lester Ortiz PA-C as Assigned PCP    The following health maintenance items are reviewed in Epic and correct as of today:  Health Maintenance   Topic Date Due     HIV SCREENING  1988     HEPATITIS C SCREENING  1991     LIPID  2019     ANNUAL REVIEW OF HM ORDERS  01/15/2022     PREVENTIVE CARE VISIT  2022     DTAP/TDAP/TD IMMUNIZATION (4 - Td) 01/15/2031     PHQ-2  Completed     INFLUENZA VACCINE  Addressed     Pneumococcal Vaccine: Pediatrics (0 to 5 Years) and At-Risk Patients (6 to 64 Years)  Aged Out     IPV IMMUNIZATION  Aged Out     MENINGITIS IMMUNIZATION  Aged Out     HEPATITIS B IMMUNIZATION  Aged Out     {Chronicprobdata (optional):206756}  {Mammo Decision Support :470278}    Review of Systems  {ROS COMP (Optional):138947}    OBJECTIVE:   There were no vitals taken for this visit. Estimated body mass index is 33.29 kg/m  as calculated from the following:    Height as of 1/15/21: 1.886 m (6' 2.25\").    Weight as of 21: 118.4 kg (261 lb).  Physical Exam  {Exam (Optional) :070784}    {Diagnostic Test Results (Optional):414306::\"Diagnostic Test Results:\",\"Labs reviewed in Epic\"}    ASSESSMENT / PLAN:   {Diag Picklist:921040}    Patient has been advised of split billing requirements and indicates understanding: {YES / NO:470465::\"Yes\"}  COUNSELING:  {Medicare Counselin}    Estimated body mass index is 33.29 kg/m  as calculated from the " "following:    Height as of 1/15/21: 1.886 m (6' 2.25\").    Weight as of 1/28/21: 118.4 kg (261 lb).    {Weight Management Plan (ACO) Complete if BMI is abnormal-  Ages 18-64  BMI >24.9.  Age 65+ with BMI <23 or >30 (Optional):253629}    He reports that he quit smoking about 20 years ago. He has quit using smokeless tobacco.      Appropriate preventive services were discussed with this patient, including applicable screening as appropriate for cardiovascular disease, diabetes, osteopenia/osteoporosis, and glaucoma.  As appropriate for age/gender, discussed screening for colorectal cancer, prostate cancer, breast cancer, and cervical cancer. Checklist reviewing preventive services available has been given to the patient.    Reviewed patients plan of care and provided an AVS. The {CarePlan:140875} for Wilbur meets the Care Plan requirement. This Care Plan has been established and reviewed with the {PATIENT, FAMILY MEMBER, CAREGIVER:538798}.    Counseling Resources:  ATP IV Guidelines  Pooled Cohorts Equation Calculator  Breast Cancer Risk Calculator  Breast Cancer: Medication to Reduce Risk  FRAX Risk Assessment  ICSI Preventive Guidelines  Dietary Guidelines for Americans, 2010  [employer]'s MyPlate  ASA Prophylaxis  Lung CA Screening    Adriano Slater MD  [facility]    Identified Health Risks:  "

## 2021-02-18 ENCOUNTER — OFFICE VISIT (OUTPATIENT)
Dept: FAMILY MEDICINE | Facility: CLINIC | Age: 46
End: 2021-02-18
Payer: COMMERCIAL

## 2021-02-18 VITALS
DIASTOLIC BLOOD PRESSURE: 84 MMHG | SYSTOLIC BLOOD PRESSURE: 116 MMHG | BODY MASS INDEX: 34.28 KG/M2 | WEIGHT: 267 LBS | TEMPERATURE: 97.6 F | HEART RATE: 94 BPM | OXYGEN SATURATION: 94 % | RESPIRATION RATE: 20 BRPM

## 2021-02-18 DIAGNOSIS — B07.8 COMMON WART: Primary | ICD-10-CM

## 2021-02-18 PROCEDURE — 17110 DESTRUCTION B9 LES UP TO 14: CPT | Performed by: FAMILY MEDICINE

## 2021-02-18 NOTE — PROGRESS NOTES
Assessment and Plan    (B07.8) Common wart  (primary encounter diagnosis)  Comment: Verbal consent obtained.  Wart(s) debrided with #10 scalpel.  Cryo treatment x3 with 15sec thaw.  Patient tolerated procedure well.    Plan: DESTRUCT BENIGN LESION, UP TO 14              RTC in 2w prn    Adriano Slater MD        Jaimie Bowles is a 46 year old who presents for the following health issues     HPI       Warts  Onset/Duration: 3 years  Description (location/number): left thumb  Accompanying signs and symptoms (pain, redness):  no   History: prior warts:  no   Therapies tried and outcome: liquid nitrogen and Compound W              Review of Systems   Wart      Objective    /84 (BP Location: Right arm, Patient Position: Sitting, Cuff Size: Adult Large)   Pulse 94   Temp 97.6  F (36.4  C) (Oral)   Resp 20   Wt 121.1 kg (267 lb)   SpO2 94%   BMI 34.28 kg/m    Body mass index is 34.28 kg/m .  Physical Exam   Common wart, pad of L thumb

## 2021-04-16 ENCOUNTER — VIRTUAL VISIT (OUTPATIENT)
Dept: URGENT CARE | Facility: CLINIC | Age: 46
End: 2021-04-16
Payer: COMMERCIAL

## 2021-04-16 ENCOUNTER — TELEPHONE (OUTPATIENT)
Dept: FAMILY MEDICINE | Facility: CLINIC | Age: 46
End: 2021-04-16

## 2021-04-16 DIAGNOSIS — G47.00 INSOMNIA, UNSPECIFIED TYPE: Primary | ICD-10-CM

## 2021-04-16 PROCEDURE — 99213 OFFICE O/P EST LOW 20 MIN: CPT | Mod: 95 | Performed by: NURSE PRACTITIONER

## 2021-04-16 NOTE — TELEPHONE ENCOUNTER
Has not had a fever since Sunday.     I can not sleep and I am not hungry.     Brain fog    Had not slept well last night. I am not sleeping and it is really tough- for 4-5 days.       Scheduled appt.     Hazel Gambino RN on 4/16/2021 at 2:22 PM

## 2021-04-16 NOTE — PROGRESS NOTES
"  Wilbur Aguilar is a 46 year old male who is being evaluated via a billable telephone visit.      The patient has been notified of following:     \"This telephone visit will be conducted via a call between you and your physician/provider. We have found that certain health care needs can be provided without the need for a physical exam.  This service lets us provide the care you need with a short phone conversation.  If a prescription is necessary we can send it directly to your pharmacy.  If lab work is needed we can place an order for that and you can then stop by our lab to have the test done at a later time.    If during the course of the call the physician/provider feels a telephone visit is not appropriate, you will not be charged for this service.\"     Patient has given verbal consent for Telephone visit?  Yes       ASSESSMENT:       ICD-10-CM    1. Insomnia, unspecified type  G47.00          Worrisome symptoms discussed with instructions to go to the ED.  Patient verbalized understanding and agreed with this plan.  Chief Complaint:    Chief Complaint   Patient presents with     Insomnia       HPI: Wilbur Aguilar is an 46 year old male who calls with concerns that include poor sleep for the last 4 nights. Diagnosed with COVID over a week ago, off quarantine tomorrow. He is having difficulty sleeping, falling asleep and staying asleep. He tried a benadryl 25mg one night which helped some but he still awoke a few times.  He is feeling very stressed for a number of reasons and when he wakes up his mind tends to be racing about all the stressors.      ROS:      A 10 point ROS is negative except as expressed in HPI.    Past Medical History  History reviewed. No pertinent past medical history.      Allergies:  No Known Allergies     Current Meds:    Current Outpatient Medications:      MULTIVITAMIN TABS   OR, 1 TABLET DAILY, Disp: , Rfl:      Omega-3 Fatty Acids (FISH OIL PO), , Disp: , Rfl:      Probiotic " Product (PROBIOTIC PO), , Disp: , Rfl:      VITAMIN E, daily, Disp: , Rfl:      PHYSICAL EXAM:     Patient is alert and oriented. Able to speak in full sentences. No wheezing or cough heard during telephone conversation. Mood is appropriate.     Gunjan Mercado CNP  4/16/2021, 10:53 AM      Phone call duration:  22 minutes

## 2021-04-16 NOTE — PATIENT INSTRUCTIONS
Plain Unisom per package instructions or Benadryl (50mg) at bedtime.    Melatonin up to 10mg at bedtime.    Could try pseudoephedrine during the day for nasal congestion, if it makes you jittery don't take at night.    Patient Education     Treating Insomnia     Learning to relax before bedtime can improve your sleep.   Good sleeping habits are a key part of treatment. If needed, some medicines may help you sleep better at first. Making healthy lifestyle changes and learning to relax can improve your sleep. Treating insomnia takes commitment. But trust that your efforts will pay off. Be sure to talk with your healthcare provider before taking any medicine.  Healthy lifestyle  Your lifestyle affects your health and your sleep. Here are some healthy habits:    Keep a regular sleep schedule. Go to bed and get up at the same time each day.    Exercise regularly. It may help you reduce stress. Avoid strenuous exercise for 2 to 4 hours before bedtime.    Avoid or limit naps, especially in the late afternoon.    Use your bed only for sleep and sex.    Don t spend too much time in bed trying to fall asleep. If you can t fall asleep, get up and do something (no electronics) until you become tired and drowsy.    Avoid or limit caffeine and nicotine for up to 6 hours before bedtime. They can keep you awake at night.     Also avoid alcohol for at least 4 to 6 hours before bedtime. It may help you fall asleep at first. But you will have more awakenings during the night. And your sleep will not be restful.  Before bedtime  To sleep better every night, try these tips:    Have a bedtime routine to let your body and mind know when it s time to sleep.    Think of going to bed as relaxing and enjoyable. Sleep will come sooner.    If your worries don t let you sleep, write them down in a diary. Then close it, and go to bed.    Make sure the room is not too hot or too cold. If it s not dark enough, an eye mask can help. If it s noisy,  try using earplugs.    Don't eat a large meal just before bedtime.    Remove noises, bright lights, TVs, cell phones, and computers from your sleeping environment.    Use a comfortable mattress and pillow.  Learn to relax  Stress, anxiety, and body tension may keep you awake at night. To unwind before bedtime, try a warm bath, meditation, or yoga. Also try the following:    Deep breathing. Sit or lie back in a chair. Take a slow, deep breath. Hold it for 5 counts. Then breathe out slowly through your mouth. Keep doing this until you feel relaxed.    Progressive muscle relaxation. Tense and then relax the muscles in your body as you breathe deeply. Start with your feet and work up your body to your neck and face.  Cognitive Behavioral Therapy (CBT)  CBT is the most effective treatment for long-term insomnia. It tries to address the underlying causes of your sleep problems, including your habits and how you think about sleep.  Individual Therapy  Richard Trinidad PsyD,   Insomnia   [facility]    [facility]: [phone]    [facility]: [phone]  [facility]  Visit [email or web address] or call [phone] to find a clinic close to you.  Suggested resources  The resources below may help you relax. This list is for information only. [facility] is not responsible for the quality of services or the actions of any person or organization. There may be a fee to use some of these resources.  Insomnia treatment books  Landen Mind by Pushpa Becerra and Leia Boyle (2013)  Overcoming Insomnia by Rusty Batista and Pushpa Becerra (2008)  Quiet Your Mind and Get to Sleep: Solutions to Insomnia for Those with Depression, Anxiety or Chronic Pain by Pushpa Boyle (2009)  No More Sleepless Nights by Keaton Michele and Sierra Fisher (1996)  Say Landen to Insomnia by Derick Cm (2009)  The Insomnia  Workbook by Yvette Curry and Luciano Elias (2009)  The Insomnia Answer by Lorenzo Corona and Brian Bacon (2006)  Stress management and relaxation books  The Relaxation and Stress Reduction Workbook by Teressa Krishna, Alejandrina Kelsey and Josiah Huerta (2008)  Stress Management Workbook: Techniques and Self-Assessment Procedures by Colette Delvalle and Smooth Walls (1997)  A Mindfulness-Based Stress Reduction Workbook by Vince Mcelroy and Demetra Peguero (2010)  The Complete Stress Management Workbook by Iban Ward, Eldon Booker and Theo Mckeon (1996)  Online programs  www.SHUTi.me (pronounced shut eye). There is a fee for this program.   www.sleepIO.com (pronounced sleep ee oh). There is a fee for this program.  Other online resources  Deep breathing and progressive muscle relaxation (PMR):    http://www.Brammo.WAKU WAKU ????  Meditation:    www.AcceleCare Wound CentersranMaharana Infrastructure and Professional Services Private Limited (MIPS)    www.Dove Innovation and ManagementguidedApartment AddameditationApartment Addasite.WAKU WAKU ????  Guided imagery:    http://www.[employer]umin[employer]    http://Partigi.WAKU WAKU ????  (click on  Resource Library,  then choose  Meditation, Relaxation, Guided Imagery )  Apps for your mobile device:    Autogenic Training Progressive Muscle Relaxation by ICAgen Stephanie    Calm: Meditation and Sleep Stories by [employer]    Oculus VR Timer - Meditation Justyna by Q Chip.  This is not a prescription and these resources are optional. You must pay for any costs when using these resources. Please ask your insurance carrier if you can be reimbursed for these resources. If so, you are responsible for sending the needed details to your insurance carrier. These resources may also be tax deductible as medical expenses. Check with your .  Date Last Reviewed: 5/18/2018  Clinically reviewed by Richard Trinidad PsyD, RODRÍGUEZ, CBSM, Director of the Insomnia and Behavioral Sleep Health Program, [facility].  For informational purposes only. Not to replace the advice of your health  care provider.  Copyright   2018 [facility]. All rights reserved.

## 2021-04-27 NOTE — PROGRESS NOTES
Assessment & Plan     Anxiety  Insomnia, unspecified type  Acute reaction to stress  Suspect stress reaction and anxiety after recent COVID illness. He denies anxiety or sleep issues prior to COVID but since has had a hard time sleeping, along with significant anxiety about sleep. Plan to trial hydroxyzine. PHQ and SHA elevated today but given no previous history of anxiety or depression will try hydroxyzine and monitor. Follow-up if persistent or worsening symptoms, consider counseling or other treatment if needed. Risks and benefits of treatment plan discussed. Patient and/or parent acknowledges and agrees with plan of care, all questions answered.    - hydrOXYzine (ATARAX) 25 MG tablet; Take 1-2 tablets (25-50 mg) by mouth 3 times daily as needed for anxiety    Return in about 10 months (around 2/28/2022) for Preventive Physical Exam.    CAROLYN Reed [facility] Bowles is a 46 year old who presents for the following health issues     HPI     Sleeping trouble  Onset/Duration: 1 month  Description:   Frequency of sleep trouble:  nightly  Time to fall asleep (sleep latency): 30 minutes  Middle of night awakening:  YES  Early morning awakening:  YES  Progression of Symptoms:  same  Accompanying Signs & Symptoms:  Daytime sleepiness/napping: YES - sleepiness, no napping  Excessive snoring/apnea: YES - snoring  Restless legs: no  Waking to urinate: no  Chronic pain:  no  Depression symptoms (if yes, do PHQ9): no  Anxiety symptoms (if yes, do SHA-7): YES  History:  Prior Insomnia: no  New stressful situation: getting Covid 1 month ago  Precipitating factors:   Caffeine intake: YES- coffee in the morning  OTC decongestants: YES  Any new medications: no  Alleviating factors:  Self medicating (alcohol, etc.):  no  Stress-reduction (exercise, yoga, meditation etc): no  Therapies tried and outcome: Melatonin, Unisom  -  Somewhat effective    Trouble sleeping since COVID  "diagnosis about 1 month ago.  Feeling really anxious since then, especially about sleep. No history of anxiety or depression.  Seems like cycle of poor sleep which worsens anxiety, which then in turn causes worse sleep.  Was sleeping well prior to COVID diagnosis.  States that he felt \"closed in\" and \"violated\" because of COVID diagnosis and having to remain in his room for several days to avoid infecting his family.  Last night, he was able to calm down, play cards with his wife, and slept well.   No HI/SI  He has tried melatonin and ashwaganda which helped some    PHQ 4/28/2021   PHQ-9 Total Score 10   Q9: Thoughts of better off dead/self-harm past 2 weeks Not at all     SHA-7 SCORE 4/28/2021   Total Score 14         Review of Systems   Constitutional, HEENT, cardiovascular, pulmonary, gi and gu systems are negative, except as otherwise noted.      Objective    /84   Pulse 92   Temp 98.2  F (36.8  C) (Oral)   Resp 18   Wt 115.8 kg (255 lb 6.4 oz)   SpO2 97%   BMI 32.79 kg/m    Body mass index is 32.79 kg/m .  Physical Exam   GENERAL: healthy, alert and no distress  NECK: no adenopathy, no asymmetry, masses, or scars and thyroid normal to palpation  RESP: lungs clear to auscultation - no rales, rhonchi or wheezes  CV: regular rate and rhythm, normal S1 S2, no S3 or S4, no murmur, click or rub, no peripheral edema and peripheral pulses strong  NEURO: Normal strength and tone, mentation intact and speech normal  PSYCH: mentation appears normal, affect normal/bright    "

## 2021-04-28 ENCOUNTER — OFFICE VISIT (OUTPATIENT)
Dept: FAMILY MEDICINE | Facility: CLINIC | Age: 46
End: 2021-04-28
Payer: COMMERCIAL

## 2021-04-28 VITALS
SYSTOLIC BLOOD PRESSURE: 122 MMHG | HEART RATE: 92 BPM | RESPIRATION RATE: 18 BRPM | OXYGEN SATURATION: 97 % | DIASTOLIC BLOOD PRESSURE: 84 MMHG | WEIGHT: 255.4 LBS | BODY MASS INDEX: 32.79 KG/M2 | TEMPERATURE: 98.2 F

## 2021-04-28 DIAGNOSIS — F43.0 ACUTE REACTION TO STRESS: ICD-10-CM

## 2021-04-28 DIAGNOSIS — F41.9 ANXIETY: Primary | ICD-10-CM

## 2021-04-28 DIAGNOSIS — G47.00 INSOMNIA, UNSPECIFIED TYPE: ICD-10-CM

## 2021-04-28 PROCEDURE — 99214 OFFICE O/P EST MOD 30 MIN: CPT | Performed by: PHYSICIAN ASSISTANT

## 2021-04-28 RX ORDER — HYDROXYZINE HYDROCHLORIDE 25 MG/1
25-50 TABLET, FILM COATED ORAL 3 TIMES DAILY PRN
Qty: 30 TABLET | Refills: 1 | Status: SHIPPED | OUTPATIENT
Start: 2021-04-28 | End: 2024-02-02 | Stop reason: SINTOL

## 2021-04-28 ASSESSMENT — ANXIETY QUESTIONNAIRES
2. NOT BEING ABLE TO STOP OR CONTROL WORRYING: MORE THAN HALF THE DAYS
1. FEELING NERVOUS, ANXIOUS, OR ON EDGE: MORE THAN HALF THE DAYS
3. WORRYING TOO MUCH ABOUT DIFFERENT THINGS: MORE THAN HALF THE DAYS
IF YOU CHECKED OFF ANY PROBLEMS ON THIS QUESTIONNAIRE, HOW DIFFICULT HAVE THESE PROBLEMS MADE IT FOR YOU TO DO YOUR WORK, TAKE CARE OF THINGS AT HOME, OR GET ALONG WITH OTHER PEOPLE: SOMEWHAT DIFFICULT
5. BEING SO RESTLESS THAT IT IS HARD TO SIT STILL: MORE THAN HALF THE DAYS
GAD7 TOTAL SCORE: 14
6. BECOMING EASILY ANNOYED OR IRRITABLE: MORE THAN HALF THE DAYS
7. FEELING AFRAID AS IF SOMETHING AWFUL MIGHT HAPPEN: MORE THAN HALF THE DAYS

## 2021-04-28 ASSESSMENT — PATIENT HEALTH QUESTIONNAIRE - PHQ9
5. POOR APPETITE OR OVEREATING: MORE THAN HALF THE DAYS
SUM OF ALL RESPONSES TO PHQ QUESTIONS 1-9: 10

## 2021-04-28 NOTE — PATIENT INSTRUCTIONS
Plan to try hydroxyzine before bed to see if this helps with anxiety and sleep. I suspect this is a stress reaction and should improve with time. Follow-up if persistent or worsening symptoms, consider counseling or other treatment if needed.

## 2021-04-29 ASSESSMENT — ANXIETY QUESTIONNAIRES: GAD7 TOTAL SCORE: 14

## 2021-05-01 ENCOUNTER — MYC MEDICAL ADVICE (OUTPATIENT)
Dept: FAMILY MEDICINE | Facility: CLINIC | Age: 46
End: 2021-05-01

## 2021-05-03 ENCOUNTER — MYC MEDICAL ADVICE (OUTPATIENT)
Dept: FAMILY MEDICINE | Facility: CLINIC | Age: 46
End: 2021-05-03

## 2021-05-03 DIAGNOSIS — F43.0 ACUTE REACTION TO STRESS: ICD-10-CM

## 2021-05-03 DIAGNOSIS — G47.00 INSOMNIA, UNSPECIFIED TYPE: ICD-10-CM

## 2021-05-03 DIAGNOSIS — F41.9 ANXIETY: Primary | ICD-10-CM

## 2021-05-03 RX ORDER — TRAZODONE HYDROCHLORIDE 50 MG/1
50-100 TABLET, FILM COATED ORAL AT BEDTIME
Qty: 60 TABLET | Refills: 0 | Status: SHIPPED | OUTPATIENT
Start: 2021-05-03 | End: 2021-06-29

## 2021-05-03 NOTE — TELEPHONE ENCOUNTER
Please see patient MyChart message in response to provider message from 5/1/21.     Luis LUDWIG RN

## 2021-09-04 ENCOUNTER — HEALTH MAINTENANCE LETTER (OUTPATIENT)
Age: 46
End: 2021-09-04

## 2022-04-16 ENCOUNTER — HEALTH MAINTENANCE LETTER (OUTPATIENT)
Age: 47
End: 2022-04-16

## 2022-10-22 ENCOUNTER — HEALTH MAINTENANCE LETTER (OUTPATIENT)
Age: 47
End: 2022-10-22

## 2023-06-01 ENCOUNTER — HEALTH MAINTENANCE LETTER (OUTPATIENT)
Age: 48
End: 2023-06-01

## 2024-01-05 ENCOUNTER — VIRTUAL VISIT (OUTPATIENT)
Dept: PEDIATRICS | Facility: CLINIC | Age: 49
End: 2024-01-05
Payer: COMMERCIAL

## 2024-01-05 DIAGNOSIS — G47.00 INSOMNIA, UNSPECIFIED TYPE: ICD-10-CM

## 2024-01-05 DIAGNOSIS — F41.1 GAD (GENERALIZED ANXIETY DISORDER): Primary | ICD-10-CM

## 2024-01-05 PROCEDURE — 99214 OFFICE O/P EST MOD 30 MIN: CPT | Mod: 95 | Performed by: INTERNAL MEDICINE

## 2024-01-05 PROCEDURE — 96127 BRIEF EMOTIONAL/BEHAV ASSMT: CPT | Mod: 95 | Performed by: INTERNAL MEDICINE

## 2024-01-05 RX ORDER — ESCITALOPRAM OXALATE 10 MG/1
10 TABLET ORAL DAILY
Qty: 90 TABLET | Refills: 0 | Status: SHIPPED | OUTPATIENT
Start: 2024-01-05 | End: 2024-02-02

## 2024-01-05 RX ORDER — TRAZODONE HYDROCHLORIDE 50 MG/1
50-100 TABLET, FILM COATED ORAL AT BEDTIME
Qty: 60 TABLET | Refills: 0 | Status: SHIPPED | OUTPATIENT
Start: 2024-01-05 | End: 2024-07-10

## 2024-01-05 ASSESSMENT — ANXIETY QUESTIONNAIRES
7. FEELING AFRAID AS IF SOMETHING AWFUL MIGHT HAPPEN: MORE THAN HALF THE DAYS
3. WORRYING TOO MUCH ABOUT DIFFERENT THINGS: MORE THAN HALF THE DAYS
GAD7 TOTAL SCORE: 13
2. NOT BEING ABLE TO STOP OR CONTROL WORRYING: MORE THAN HALF THE DAYS
1. FEELING NERVOUS, ANXIOUS, OR ON EDGE: MORE THAN HALF THE DAYS
5. BEING SO RESTLESS THAT IT IS HARD TO SIT STILL: MORE THAN HALF THE DAYS
7. FEELING AFRAID AS IF SOMETHING AWFUL MIGHT HAPPEN: MORE THAN HALF THE DAYS
4. TROUBLE RELAXING: MORE THAN HALF THE DAYS
6. BECOMING EASILY ANNOYED OR IRRITABLE: SEVERAL DAYS
GAD7 TOTAL SCORE: 13
8. IF YOU CHECKED OFF ANY PROBLEMS, HOW DIFFICULT HAVE THESE MADE IT FOR YOU TO DO YOUR WORK, TAKE CARE OF THINGS AT HOME, OR GET ALONG WITH OTHER PEOPLE?: VERY DIFFICULT
IF YOU CHECKED OFF ANY PROBLEMS ON THIS QUESTIONNAIRE, HOW DIFFICULT HAVE THESE PROBLEMS MADE IT FOR YOU TO DO YOUR WORK, TAKE CARE OF THINGS AT HOME, OR GET ALONG WITH OTHER PEOPLE: VERY DIFFICULT
GAD7 TOTAL SCORE: 13

## 2024-01-05 ASSESSMENT — PATIENT HEALTH QUESTIONNAIRE - PHQ9
SUM OF ALL RESPONSES TO PHQ QUESTIONS 1-9: 11
SUM OF ALL RESPONSES TO PHQ QUESTIONS 1-9: 11
10. IF YOU CHECKED OFF ANY PROBLEMS, HOW DIFFICULT HAVE THESE PROBLEMS MADE IT FOR YOU TO DO YOUR WORK, TAKE CARE OF THINGS AT HOME, OR GET ALONG WITH OTHER PEOPLE: VERY DIFFICULT

## 2024-01-05 NOTE — PROGRESS NOTES
Wilbur is a 49 year old who is being evaluated via a billable video visit.      How would you like to obtain your AVS? MyChart  If the video visit is dropped, the invitation should be resent by: Text to cell phone: [phone]  Will anyone else be joining your video visit? No          Assessment & Plan     SHA (generalized anxiety disorder)  Generalized anxiety, fear of mortality that comes and goes, often manifests as insomnia and fear of falling asleep that coexists with fear or not sleeping. Has not been on selective serotonin reuptake inhibitor medication in the past. Will trial escitalopram. Also interested in therapy.   - escitalopram (LEXAPRO) 10 MG tablet; Take 1 tablet (10 mg) by mouth daily  [facility]    Insomnia, unspecified type  Escitalopram as above for anxiety, also can continue to use trazodone as needed for insomnia.   - traZODone (DESYREL) 50 MG tablet; Take 1-2 tablets ( mg) by mouth at bedtime  - [facility]  Referral; Future             Depression Screening Follow Up        1/5/2024    10:17 AM   PHQ   PHQ-9 Total Score 11   Q9: Thoughts of better off dead/self-harm past 2 weeks Not at all         Follow Up Actions Taken  Patient counseled, no additional follow up at this time.         Crystal Miramontes MD  [facility]    Subjective   Wilbur is a 49 year old, presenting for the following health issues:  No chief complaint on file.      HPI     Wilbur describes that he had insomnia when he had covid infection a few years ago. It mostly got better even though he was a bit slower to fall asleep than previously.     Recently with a heavy work schedule he has been having head racing, heart racing, hot head, anxiety when going to bed. He has started to have constant worry though the day about not being able to go to sleep.     Once in a while he would get anxiety about not being present when sleeping.     He tried trazodone when  he had insomnia with covid. He just started using it again this week.     No anxiety about other issues - only about sleep. He feels anxiety about not falling asleep, and about how he will not be able to get enough sleep when waking up early in the morning.     When this started with covid, he felt like he became aware of his mortality.     As a child he had a problem with stammering, and even later had trouble/anxiety with speaking in public. He feels that it really affected him. He will still have moments when he needs to get on the phone and he gets very anxious about talking.     Also feels that he is affected by the season, when it's dark he feels that the worries are worse, he has more anxiety, more fear of mortality.               Review of Systems         Objective           Vitals:  No vitals were obtained today due to virtual visit.    Physical Exam   GENERAL: Healthy, alert and no distress  EYES: Eyes grossly normal to inspection.  No discharge or erythema, or obvious scleral/conjunctival abnormalities.  RESP: No audible wheeze, cough, or visible cyanosis.  No visible retractions or increased work of breathing.    SKIN: Visible skin clear. No significant rash, abnormal pigmentation or lesions.  NEURO: Cranial nerves grossly intact.  Mentation and speech appropriate for age.  PSYCH: Mentation appears normal, affect normal/bright, judgement and insight intact, normal speech and appearance well-groomed.                Video-Visit Details    Type of service:  Video Visit     Originating Location (pt. Location): Home    Distant Location (provider location):  Off-site  Platform used for Video Visit: NantMobile

## 2024-01-05 NOTE — PATIENT INSTRUCTIONS
Take one tab daily for 2 weeks, then increase to 2 tabs (20 mg) daily.     We will talk again in 4 weeks.     You can use trazodone as needed for insomnia.

## 2024-01-09 ENCOUNTER — TELEPHONE (OUTPATIENT)
Dept: BEHAVIORAL HEALTH | Facility: CLINIC | Age: 49
End: 2024-01-09
Payer: COMMERCIAL

## 2024-01-09 NOTE — TELEPHONE ENCOUNTER
----- Message from Segundo Melvin sent at 1/9/2024 11:04 AM CST -----  Transition Clinic Referral   [place]       Please Check Type of Referral Requested:       _X___THERAPY: The Transition clinic is able to schedule patients without current medical insurance; these patient will be referred to our Social Work Care Coordinator for Medical Insurance              Assistance. We are open for referral for psychotherapy. Patient is referred from:  PCP at   IM/PEDS      _NA___MEDICATION:   Referrals for Medication are ONLY accepted from the following areas (select): NA                                        Suboxone and Opioid Management Referrals are automatically denied.   TC Psychiatry cannot see patient without active medical insurance.   Next level of psychiatry care must be within 12 months.  TC Psychiatry cannot accept patient with next level of care scheduled with PCP.  The transition clinic cannot follow patients who are on a restricted recipient program.    ___ Long-Acting Injection (WU)?    Is referred patient receiving a long-acting injection (WU)?  If YES, provide the following:    Name and dose of Medication: na  Date Injection was last administered to patient: na  Next Long- Acting injection Due Date: na    Is referred patient transferring from [facility]?  If YES, provide the following:     ___  WU will be receiving WU at [facility] [place]  ___  WU will be administered per an IRTS or elsewhere in the community       GUARDIAN: If your patient is not their own Guardian, please provide the following:    Guardian Name:  Guardian Contact Information (Phone & Email) :  Guardian Address:     FOSTER CARE PROVIDER: If your patient lives at a Licensed Foster Care, please provide the following:    Foster Provider:  Foster Provider Contact Information (Phone & Email):  Foster Provider Address:     Referring Provider Contact Name:  Crystal Miramontes MD2; Phone Number:  unk    Reason for Transition Clinic Referral: G47.00 (ICD-10-CM) - Insomnia, unspecified type   F41.1 (ICD-10-CM) - SHA (generalized anxiety disorder)       Next Level of Care Patient Will Be Transitioned To:       Wilbur Aguilar MRN: [id number]   Date: 5/9/2024 Status: Scheduled  Time: 2:00 PM Length: 60  Visit Type: ADULT PSYCHOTHERAPY NEW [[id number]] Copay: $25.00  Provider: Zaira Alexander LP Department: [facility]  Encounter #: [id number]        What Would Be Helpful from the Transition Clinic: bridge gap     Needs: NO    Does Patient Have Access to Technology: yes    Patient E-mail Address: [email or web address]    Current Patient Phone Number: [phone];     Clinician Gender Preference (if applicable): unk    Patient location preference: unk    Segundo Melvin      (Master Form: Updated 11/28/2023)

## 2024-01-09 NOTE — TELEPHONE ENCOUNTER
Writer spoke with pt and scheduled initial TC therapy appointment on 01/17/2024 @  10:00 am. Writer sent intake documents via [employer]. Writer will reply to referral source.Tracker completed.    Vidhya Garcia  01/09/2024  [id number]

## 2024-01-17 ENCOUNTER — TELEPHONE (OUTPATIENT)
Dept: BEHAVIORAL HEALTH | Facility: CLINIC | Age: 49
End: 2024-01-17
Payer: COMMERCIAL

## 2024-01-17 ENCOUNTER — VIRTUAL VISIT (OUTPATIENT)
Dept: BEHAVIORAL HEALTH | Facility: CLINIC | Age: 49
End: 2024-01-17
Payer: COMMERCIAL

## 2024-01-17 DIAGNOSIS — F41.1 GAD (GENERALIZED ANXIETY DISORDER): Primary | ICD-10-CM

## 2024-01-17 ASSESSMENT — PATIENT HEALTH QUESTIONNAIRE - PHQ9
SUM OF ALL RESPONSES TO PHQ QUESTIONS 1-9: 8
SUM OF ALL RESPONSES TO PHQ QUESTIONS 1-9: 8
10. IF YOU CHECKED OFF ANY PROBLEMS, HOW DIFFICULT HAVE THESE PROBLEMS MADE IT FOR YOU TO DO YOUR WORK, TAKE CARE OF THINGS AT HOME, OR GET ALONG WITH OTHER PEOPLE: SOMEWHAT DIFFICULT

## 2024-01-17 ASSESSMENT — COLUMBIA-SUICIDE SEVERITY RATING SCALE - C-SSRS
1. HAVE YOU WISHED YOU WERE DEAD OR WISHED YOU COULD GO TO SLEEP AND NOT WAKE UP?: NO
1. IN THE PAST MONTH, HAVE YOU WISHED YOU WERE DEAD OR WISHED YOU COULD GO TO SLEEP AND NOT WAKE UP?: NO
ATTEMPT LIFETIME: NO

## 2024-01-17 NOTE — PROGRESS NOTES
[facility]      PATIENT'S NAME: Wilbur Aguilar  PREFERRED NAME: Wilbur  PRONOUNS: He, Him, His  MRN: [id number]  : 1975  ADDRESS: [address]  [facility] [address]  ACCT. NUMBER:  [id number]  DATE OF SERVICE: 24  START TIME: 1133  END TIME: 1242  PREFERRED PHONE: [phone]  May we leave a program related message: Yes  EMERGENCY CONTACT: was obtained Viry Aguilar [phone]  SERVICE MODALITY:  Video Visit:      Provider verified identity through the following two step process.  Patient provided:  Patient photo, Patient , and Patient address    Telemedicine Visit: The patient's condition can be safely assessed and treated via synchronous audio and visual telemedicine encounter.      Reason for Telemedicine Visit: Patient has requested telehealth visit    Originating Site (Patient Location): Patient's home    Distant Site (Provider Location): Provider Remote Setting- Home Office    Consent:  The patient/guardian has verbally consented to: the potential risks and benefits of telemedicine (video visit) versus in person care; bill my insurance or make self-payment for services provided; and responsibility for payment of non-covered services.     Patient would like the video invitation sent by:  My Chart    Mode of Communication:  Video Conference via [facility]    Distant Location (Provider):  Off-site    As the provider I attest to compliance with applicable laws and regulations related to telemedicine.    Psychotherapist Informed Consent    This provider and patient discussed HIPAA, and the limits of confidentiality; including mandated reporting, the possibility of collaborative discussions with patient's primary care provider and the multidisciplinary team in the MH clinic during consultation.  Discussed the no show policy, and the benefits and possible unintended consequences of therapy.  Patient indicated understanding [facility] services are short term, solution  "focused, until a referral can be made and patient can bridge to long term therapy.  Patient verbally indicated understanding the informed consent.        [facility]ULT [facility] DIAGNOSTIC ASSESSMENT    Identifying Information:  Patient is a 49 year old,   individual.  Patient was referred for an assessment by primary care clinic.  Patient attended the session .    Chief Complaint:   The reason for seeking services at this time is: \"Anxiety\".  The problem(s) began 12/15/22.    Patient has attempted to resolve these concerns in the past through PHP, talking to spouse .    Social/Family History:  Patient reported they grew up in [place].  They were raised by biological parents.  Parents were always together.  Patient reported that their childhood was good.  Patient described their current relationships with family of origin as good.     The patient describes their cultural background as .  Cultural influences and impact on patient's life structure, values, norms, and healthcare: Sikh.  Contextual influences on patient's health include: Contextual Factors: Individual Factors COVID life role changes .    These factors will be addressed in the Preliminary Treatment plan. Patient identified their preferred language to be English. Patient reported they does not need the assistance of an  or other support involved in therapy.     Patient reported had no significant delays in developmental tasks.   Patient's highest education level was high school graduate  .  Patient identified the following learning problems: speech.  Modifications will not be used to assist communication in therapy. Patient reports they are  able to understand written materials.    Patient reported the following relationship history 1 primary relationship.  Patient's current relationship status is  for 28.   Patient identified their sexual orientation as heterosexual.  Patient reported having 3 " child(tatianna). Patient identified partner as part of their support system.  Patient identified the quality of these relationships as stable and meaningful.      Patient's current living/housing situation involves staying in own home/apartment.  The immediate members of family and household include Viry, 49,Wife and they report that housing stable.    Patient is currently employed fulltime. He has been employed at [employer] 32 years. Patient reports their finances are obtained through employment. Patient does not identify finances as a current stressor.      Patient reported that they have not been involved with the legal system.  Patient does not report being under probation/ parole/ jurisdiction. .    Patient's Strengths and Limitations:  Patient identified the following strengths or resources that will help them succeed in treatment: Jain / Jew, commitment to health and well being, exercise routine, nicky / spirituality, friends / good social support, family support, intelligence, positive work environment, motivation, and work ethic. Things that may interfere with the patient's success in treatment include: none identified.     Assessments:  The following assessments were completed by patient for this visit:  PHQ9:       4/28/2021    11:20 AM 1/5/2024    10:17 AM 1/17/2024     9:39 AM 1/17/2024    11:21 AM   PHQ-9 SCORE   PHQ-9 Total Score MyChart  11 (Moderate depression) 8 (Mild depression) 8 (Mild depression)   PHQ-9 Total Score 10 11 8 8     GAD7:       4/28/2021    11:20 AM 1/5/2024    10:20 AM 1/17/2024     9:49 AM   SHA-7 SCORE   Total Score  13 (moderate anxiety) 13 (moderate anxiety)   Total Score 14 13 13     CAGE-AID:       1/17/2024    11:22 AM   CAGE-AID Total Score   Total Score 0   Total Score MyChart 0 (A total score of 2 or greater is considered clinically significant)     PROMIS 10-Global Health (only subscores and total score):       1/17/2024     9:37 AM 1/17/2024    11:22 AM   PROMIS-10  Scores Only   Global Mental Health Score 13 15   Global Physical Health Score 17 17   PROMIS TOTAL - SUBSCORES 30 32     [place] Suicide Severity Rating Scale (Lifetime/Recent)      1/17/2024     9:37 AM 1/17/2024    11:00 AM   [place] Suicide Severity Rating (Lifetime/Recent)   Q1 Wish to be Dead (Lifetime)  N   1. Wish to be Dead (Past 1 Month) N N   2. Non-Specific Active Suicidal Thoughts (Past 1 Month) N    Actual Attempt (Lifetime)  N   Calculated C-SSRS Risk Score (Lifetime/Recent) No Risk Indicated No Risk Indicated     Personal and Family Medical History:  Patient does not report a family history of mental health concerns.  Patient reports family history includes Breast Cancer in his mother; Cancer in his mother and paternal grandfather; Diabetes in his father and paternal grandmother; Hypertension in his maternal grandmother; Other Cancer in his maternal grandfather, mother, and paternal grandfather..     Patient does report Mental Health Diagnosis and/or Treatment.  Patient Patient reported the following previous diagnoses which include(s): an Anxiety Disorder.  Patient reported symptoms began With COVID in 2020.   Patient has not received mental health services in the past: Psychiatric Hospitalizations: None Patient denies a history of civil commitment.  Patient is not receiving other mental health services.        Patient has had a physical exam to rule out medical causes for current symptoms.  Date of last physical exam was within the past year. Client was encouraged to follow up with PCP if symptoms were to develop. The patient has a [facility] Provider, who is named [facility]..  Patient reports no current medical and/or dental concerns.  Patient denies any issues with pain..   There are not significant appetite / nutritional concerns / weight changes.   Patient does not report a history of head injury / trauma / cognitive impairment.      Patient reports  current meds as:   Outpatient Medications Marked as Taking for the 1/17/24 encounter (Virtual Visit) with Becky Ruiz LICSW   Medication Sig    escitalopram (LEXAPRO) 10 MG tablet Take 1 tablet (10 mg) by mouth daily    MULTIVITAMIN TABS   OR 1 TABLET DAILY    Omega-3 Fatty Acids (FISH OIL PO)     Probiotic Product (PROBIOTIC PO)     traZODone (DESYREL) 50 MG tablet Take 1-2 tablets ( mg) by mouth at bedtime       Medication Adherence:  Patient reports taking.  taking prescribed medications as prescribed.    Patient Allergies:  No Known Allergies    Medical History:  No past medical history on file.      Current Mental Status Exam:   Appearance:  Appropriate    Eye Contact:  Good   Psychomotor:  Normal       Gait / station:  no problem  Attitude / Demeanor: Cooperative   Speech      Rate / Production: Normal/ Responsive      Volume:  Normal  volume      Language:  no problems  Mood:   Anxious   Affect:   Appropriate    Thought Content: Clear   Thought Process: Coherent  Goal Directed       Associations: No loosening of associations  Insight:   Good   Judgment:  Intact   Orientation:  Person Place Time Situation  Attention/concentration: Good    Substance Use:   Patient did not report a family history of substance use concerns; see medical history section for details.  Patient has not received chemical dependency treatment in the past.  Patient has not ever been to detox.      Patient is not currently receiving any chemical dependency treatment. Patient reported the following problems as a result of their substance use:   None .    Patient occasional alcohol use. Last use was 12/25/2023. Stated age 13  Patient denies using tobacco. Used in the past beginning age 15.  Last use 12/11/2015  Patient denies using cannabis.  Patient reports using caffeine. Last use 1/4/2024. Don't know age when started using.   Patient reports using/abusing the following substance(s). Patient reported no other substance  use.     Substance Use: work absence due to substance use    Based on the negative CAGE score and clinical interview there  are not indications of drug or alcohol abuse.    Significant Losses / Trauma / Abuse / Neglect Issues:   Patient did not serve in the .  There are indications or report of significant loss, trauma, abuse or neglect issues related to: are no indications and client denies any losses, trauma, abuse, or neglect concerns.  Concerns for possible neglect are not present.     Safety Assessment:   Patient denies current homicidal ideation and behaviors.  Patient denies current self-injurious ideation and behaviors.    Patient denied risk behaviors associated with substance use.   Patient denies any high risk behaviors associated with mental health symptoms.  Patient reports the following current concerns for their personal safety: None.  Patient reports there are not firearms in the house.         History of Safety Concerns:  Patient denied a history of homicidal ideation.     Patient denied a history of personal safety concerns.    Patient denied a history of assaultive behaviors.    Patient denied a history of sexual assault behaviors.     Patient denied a history of risk behaviors associated with substance use.  Patient denies any history of high risk behaviors associated with mental health symptoms.  Patient reports the following protective factors: forward or future oriented thinking; dedication to family or friends; safe and stable environment; regular physical activity; sense of belonging; purpose; abstinence from substances; living with other people; daily obligations; structured day; commitment to well being; sense of meaning; positive social skills; healthy fear of risky behaviors or pain; financial stability; strong sense of self worth or esteem; sense of personal control or determination    Risk Plan:  See Recommendations for Safety and Risk Management Plan    Review of Symptoms per  patient report:   Depression: Change in sleep, Excessive or inappropriate guilt, Feelings of hopelessness, and Low self-worth  Siri:  No Symptoms  Psychosis: No Symptoms    Anxiety: Excessive worry, Nervousness, Social anxiety, Sleep disturbance, Psychomotor agitation, Ruminations, Poor concentration, Irritability, and fears, (fear of mortality, fear of falling to sleep) conflict between not sleeping and fear of sleeping. Racing thoughts, racing heart, heat flashes, increased nervousness at bedtime. Worry is around not going to sleep, not able to wake up early enough and feeing tired the next day.  Stammer on the phone.  Uncomfortable talking to others at times. Unsure of himself.    Panic:  No symptoms  Post Traumatic Stress Disorder:  No Symptoms Not reported  Eating Disorder: No Symptoms  ADD / ADHD:  No symptoms  Conduct Disorder: No symptoms  Autism Spectrum Disorder: No symptoms  Obsessive Compulsive Disorder: No Symptoms    Patient reports the following compulsive behaviors and treatment history:  no symptoms reported .      Diagnostic Criteria:   Generalized Anxiety Disorder  A. Excessive anxiety and worry about a number of events or activities (such as work or school performance).   B. The person finds it difficult to control the worry.  C. Select 3 or more symptoms (required for diagnosis). Only one item is required in children.   - Restlessness or feeling keyed up or on edge.    - Being easily fatigued.    - Difficulty concentrating or mind going blank.    - Irritability.    - Muscle tension.    - Sleep disturbance (difficulty falling or staying asleep, or restless unsatisfying sleep).   D. The focus of the anxiety and worry is not confined to features of an Axis I disorder.  E. The anxiety, worry, or physical symptoms cause clinically significant distress or impairment in social, occupational, or other important areas of functioning.   F. The disturbance is not due to the direct physiological effects of  a substance (e.g., a drug of abuse, a medication) or a general medical condition (e.g., hyperthyroidism) and does not occur exclusively during a Mood Disorder, a Psychotic Disorder, or a Pervasive Developmental Disorder.    Functional Status:  Patient reports the following functional impairments:  home life with spouse, social interactions, and work / vocational responsibilities.     Nonprogrammatic care:  Patient is requesting basic services to address current mental health concerns.    Clinical Summary:  1. Psychosocial, Cultural and Contextual Factors:  raised [place] in middle class home with parent ans 2 siblings. Had friend(s) who lived near.  Had/has friend who shares history of stammer who offers support. Nondenominational/raised Nondenominational.  Works hard, change is not always good. Stability is holding steady, staying comfortably with what is known. Time dedicated to a task, belief, career, role is to be appreciated, proud of.   Principal DSM5 Diagnoses  (Sustained by DSM5 Criteria Listed Above):   300.02 (F41.1) Generalized Anxiety Disorder.  3. Other Diagnoses that is relevant to services:   None  4. Provisional Diagnosis:  300.23 (F40.10) Social Anxiety Disorder as evidenced by uncomfortable interacting in some circumstances.   5. Prognosis: Expect Improvement and Relieve Acute Symptoms.  6. Likely consequences of symptoms if not treated: Continued symptoms leading to further decompensation and inability to function as well as he believes he should.  7. Client strengths include:  caring, creative, empathetic, employed, goal-focused, good listener, insightful, intelligent, motivated, open to suggestions / feedback, responsible parent, and support of family, friends and providers .     Recommendations:     1. Plan for Safety and Risk Management:   Safety and Risk: Recommended that patient call 911 or go to the local ED should there be a change in any of these risk factors..          Report to child / adult  protection services was  No problems or concerns noted or reported .     2. Patient's identified mental health concerns with a cultural influence will be addressed by long term therapy . Social, employment functioning, family life    3. Initial Treatment will focus on:    Anxiety - resulting in sleep problems, insecurity .     4. Resources/Service Plan:    services are not indicated.   Modifications to assist communication are not indicated.   Additional disability accommodations are not indicated.      5. Collaboration:   Collaboration / coordination of treatment will be initiated with the following  support professionals:  no needs identified at intake .      6.  Referrals:  The following referral(s) will be initiated: Outpatient [facility].  Referral to [facility]     A Release of Information has been obtained for the following:  no ASMITA needs identified at intake .     Clinical Substantiation/medical necessity for the above recommendations:    Patient reports a history of anxiety he has managed successfully until 2020 when he became ill with COVID. He began thinking about his own mortality, his life, where he was at in life.  He managed well and with family support he was able to reduce or eliminate debilitating anxiety. In Dec 2023 he noted increased anxiety around life role changes and his own mortality,  He began experiencing insomnia which began a feed back loop; anxiety ans worry about life change, death, resulting in sleep problems, further worry about not functioning as well as he believes he should, further demise, resulting in further symptoms, emotional and physical and further sleep problems. Though he has historically managed anxiety well his current symptoms and impact on functioning has exceeded his coping skills resulting in insomnia and further anxiety.     Wilbur would benefit from individual therapy to process the young old life stage of life, facing the reality of aging  and dying, living in the moment, overall satisfied with his performance in a constructive and healthy way, and processing through a review of past life successes and challenges, leading toward acceptance, thus finding a new comfort in himself.      7. MJ:    MJ:  Discussed the general effects of drugs and alcohol on health and well-being. Provider gave patient printed information about the  effects of chemical use on their health and well being. Recommendations:  continued minimal use.     8. Records:   These were reviewed at time of assessment.     Information in this assessment was obtained from the medical record and  provided by patient who is a good historian.        Patient will have open access to their mental health medical record.    9.   Interactive Complexity: No    10. Safety Plan:  Patient denied any current/recent/lifetime history of suicidal ideation and/or behaviors.  No safety plan indicated at this time.     Provider Name/ Credentials:  [facility]    January 17, 2024        Answers submitted by the patient for this visit:  Patient Health Questionnaire (Submitted on 1/17/2024)  If you checked off any problems, how difficult have these problems made it for you to do your work, take care of things at home, or get along with other people?: Somewhat difficult  PHQ9 TOTAL SCORE: 8

## 2024-01-17 NOTE — TELEPHONE ENCOUNTER
Writer spoke with patient on que and scheduled same day visit for 01/17/2024 @ 11:30 am.    Vidhya Garcia  01/17/2024  1003

## 2024-01-31 ENCOUNTER — VIRTUAL VISIT (OUTPATIENT)
Dept: BEHAVIORAL HEALTH | Facility: CLINIC | Age: 49
End: 2024-01-31
Payer: COMMERCIAL

## 2024-01-31 DIAGNOSIS — F41.1 GENERALIZED ANXIETY DISORDER: Primary | ICD-10-CM

## 2024-01-31 ASSESSMENT — ANXIETY QUESTIONNAIRES
7. FEELING AFRAID AS IF SOMETHING AWFUL MIGHT HAPPEN: NOT AT ALL
GAD7 TOTAL SCORE: 1
2. NOT BEING ABLE TO STOP OR CONTROL WORRYING: NOT AT ALL
5. BEING SO RESTLESS THAT IT IS HARD TO SIT STILL: NOT AT ALL
4. TROUBLE RELAXING: NOT AT ALL
GAD7 TOTAL SCORE: 1
3. WORRYING TOO MUCH ABOUT DIFFERENT THINGS: NOT AT ALL
1. FEELING NERVOUS, ANXIOUS, OR ON EDGE: SEVERAL DAYS
6. BECOMING EASILY ANNOYED OR IRRITABLE: NOT AT ALL

## 2024-01-31 ASSESSMENT — PATIENT HEALTH QUESTIONNAIRE - PHQ9: SUM OF ALL RESPONSES TO PHQ QUESTIONS 1-9: 1

## 2024-01-31 ASSESSMENT — COLUMBIA-SUICIDE SEVERITY RATING SCALE - C-SSRS
1. SINCE LAST CONTACT, HAVE YOU WISHED YOU WERE DEAD OR WISHED YOU COULD GO TO SLEEP AND NOT WAKE UP?: NO
2. HAVE YOU ACTUALLY HAD ANY THOUGHTS OF KILLING YOURSELF?: NO

## 2024-02-01 NOTE — PROGRESS NOTES
Transition Clinic Progress Note   Discharge Summary    Discharge Summary Date:  2024  Multiple Sessions    Client Name:  Wilbur Aguilar MRN#: [id number] YOB: 1975    Service Modality: Service Modality: Video Visit:      Provider verified identity through the following two step process.  Patient provided:  Patient photo, Patient , and Patient address    Telemedicine Visit: The patient's condition can be safely assessed and treated via synchronous audio and visual telemedicine encounter.      Reason for Telemedicine Visit: Patient has requested telehealth visit    Originating Site (Patient Location): Patient's home    Distant Site (Provider Location): Provider Remote Setting- Home Office    Consent:  The patient/guardian has verbally consented to: the potential risks and benefits of telemedicine (video visit) versus in person care; bill my insurance or make self-payment for services provided; and responsibility for payment of non-covered services.     Patient would like the video invitation sent by:  My Chart    Mode of Communication:  Video Conference via Amwell    Distant Location (Provider):  Off-site    As the provider I attest to compliance with applicable laws and regulations related to telemedicine.    Service Type: Individual       VISIT TIME START: 935  VISIT TIME END: 958      Session Length: TC Session Length: 30 (16-37 Minutes)     Session #: 2     Attendees: Client attended alone    Why is patient discharging from the clinic? Completed goals / no longer needed    Level of Care of Patient Discharge: Other: home no services    Informed Consent and Assessment Methods    Provided at intake on 2024    Progress Since Last Session (Related to Symptoms / Goals / Homework):   Symptoms: Improving Wilbur reports anxiety is no longer an issue. He states he is able to sleep and has no ruminations.   SHA score is 1.  Homework: Achieved / completed to satisfaction  Patient  Presentation:  Wilbur presented happy, at ease and reported he has little anxiety. He is sleeping well without fear. Wilbru has mild social anxiety related to as speech problem, stutter. Provider lead discussion on ways to address his stutter. He states he sometimes just stops talking for a minute and starts again. Provider congratulate Wilbur on his attempts to manage anxiety.  Congratulated Wilbur's resiliency.  Wilbur states he has great support in his wife and friends. Wilbur reported no SI.He states he continues to take medication Lexapro as directed. He states he is still at 10 mg which he states has been effective.  Wilbur states provider instructed him to up the dose if needed however Wilbur states he doesn't think he needs to.  Wilbur continues to work out,lifting weights 3 to 4 times a week. Wilbur instructed to contact TC and this provider as needed.      Focus of Treatment Objective(s):  Client's presenting concerns included: Anxiety - Fear of death, mortality, Insomnia, ruminations, mild soc anxiety.  Stage of Change at time of Discharge: MAINTENANCE (Working to maintain change, with risk of relapse)    Intervention:    Solution Focused Brief Therapy:                Reduce anxiety from a 13 to an 8 or 9                Resolve insomnia related to fear of death.    Strengthen patient resiliency  Medication Adherence:  Yes    Chemical Use:  No    ASSESSMENT:  Assessments completed during on hard copy and transferred to epic screen     The following assessments were completed by patient for this visit:  PHQ9:       4/28/2021    11:20 AM 1/5/2024    10:17 AM 1/17/2024     9:39 AM 1/17/2024    11:21 AM 1/31/2024     8:00 PM   PHQ-9 SCORE   PHQ-9 Total Score MyChart  11 (Moderate depression) 8 (Mild depression) 8 (Mild depression)    PHQ-9 Total Score 10 11 8 8 1     GAD7:       4/28/2021    11:20 AM 1/5/2024    10:20 AM 1/17/2024     9:49 AM 1/31/2024     7:00 PM   SHA-7 SCORE   Total Score  13 (moderate anxiety) 13 (moderate  anxiety)    Total Score 14 13 13 1     PROMIS 10-Global Health (only subscores and total score):       1/17/2024     9:37 AM 1/17/2024    11:22 AM 1/31/2024     8:00 PM   PROMIS-10 Scores Only   Global Mental Health Score 13 15 16   Global Physical Health Score 17 17 17   PROMIS TOTAL - SUBSCORES 30 32 33     Colbert Suicide Severity Rating Scale (Lifetime/Recent)      1/17/2024     9:37 AM 1/17/2024    11:00 AM   Colbert Suicide Severity Rating (Lifetime/Recent)   Q1 Wish to be Dead (Lifetime)  N   1. Wish to be Dead (Past 1 Month) N N   2. Non-Specific Active Suicidal Thoughts (Past 1 Month) N    Actual Attempt (Lifetime)  N   Calculated C-SSRS Risk Score (Lifetime/Recent) No Risk Indicated No Risk Indicated     Colbert Suicide Severity Rating Scale (Short Version)      1/31/2024     7:00 PM   Colbert Suicide Severity Rating (Short Version)   1. Wish to be Dead (Since Last Contact) N   2. Non-Specific Active Suicidal Thoughts (Since Last Contact) N   Has subject engaged in non-suicidal self-injurious behavior? (Since Last Contact) N   Calculated C-SSRS Risk Score (Since Last Contact) No Risk Indicated     Assessment: Current Emotional / Mental Status (status of significant symptoms):  Risk status (Self / Other harm or suicidal ideation)  Client denies current fears or concerns for personal safety.  Client denies current or recent suicidal ideation or behaviors.  Client denies current or recent homicidal ideation or behaviors.  Client denies current or recent self injurious behavior or ideation.  Client denies other safety concerns.  A safety and risk management plan has not been developed at this time, however client was given the after-hours number should there be a change in any of these risk factors.    Appearance:   Appropriate   Eye Contact:   Good   Psychomotor Behavior: Normal   Attitude:   Cooperative   Orientation:   Person Place Time Situation  Speech   Rate / Production: Normal/ Responsive Normal     Volume:  Normal   Mood:    Normal  Affect:    Appropriate   Thought Content:  Clear   Thought Form:  Coherent  Logical   Insight:   Good     DSM5 Diagnoses: (Sustained by DSM5 Criteria Listed Above)  Diagnoses: 300.02 (F41.1) Generalized Anxiety Disorder  Psychosocial & Contextual Factors: Mild social anxiety related to speech/stutter.  Has great family support. Has strong work ethic, with longevity at current employment since High school.     Reason for Discharge:  Goals completed    Aftercare Plan:  Client may resume counseling services at any time in the future by calling the [facility] Intake Office, [phone].    Procedure Code: Psychotherapy (with patient) - 30  [CPT 73789]    Becky Ruiz, [facility]  January 31, 2024

## 2024-02-02 ENCOUNTER — VIRTUAL VISIT (OUTPATIENT)
Dept: PEDIATRICS | Facility: CLINIC | Age: 49
End: 2024-02-02
Payer: COMMERCIAL

## 2024-02-02 DIAGNOSIS — F41.1 GENERALIZED ANXIETY DISORDER: Primary | ICD-10-CM

## 2024-02-02 DIAGNOSIS — Z12.11 SPECIAL SCREENING FOR MALIGNANT NEOPLASMS, COLON: ICD-10-CM

## 2024-02-02 DIAGNOSIS — F51.01 PRIMARY INSOMNIA: ICD-10-CM

## 2024-02-02 PROBLEM — F41.9 ANXIETY: Status: ACTIVE | Noted: 2024-02-02

## 2024-02-02 PROCEDURE — 99213 OFFICE O/P EST LOW 20 MIN: CPT | Mod: 95 | Performed by: INTERNAL MEDICINE

## 2024-02-02 RX ORDER — ESCITALOPRAM OXALATE 10 MG/1
10 TABLET ORAL DAILY
Qty: 90 TABLET | Refills: 3 | Status: SHIPPED | OUTPATIENT
Start: 2024-02-02

## 2024-02-02 NOTE — PROGRESS NOTES
Wilbur is a 49 year old who is being evaluated via a billable video visit.      How would you like to obtain your AVS? MyChart  If the video visit is dropped, the invitation should be resent by: Text to cell phone: [phone]  Will anyone else be joining your video visit? No          Assessment & Plan     (F41.1) Generalized anxiety disorder  (primary encounter diagnosis)  Comment: Significantly improved on 10 mg lexapro daily. Sleeping much better.   Plan: escitalopram (LEXAPRO) 10 MG tablet            (F51.01) Primary insomnia  Comment: uses trazodone intermittently generally sleep is improved.     (Z12.11) Special screening for malignant neoplasms, colon  Comment: routine screening  Plan: Colonoscopy Screening  Referral                          Subjective   Wilbur is a 49 year old, presenting for the following health issues:  No chief complaint on file.    HPI     Wilbur reports that he's doing well. Sleeping well. Met with a therapist x2, and decided mutually to stop because he's doing so well.     His anxiety is going really well. Much improved, taking lexapro 10 mg daily. This is very helpful.     He has taken the trazodone a few times, it really helps. But overall his sleep is better since starting the lexapro.     He notes that he is also due for a colonoscopy.                     Objective           Vitals:  No vitals were obtained today due to virtual visit.    Physical Exam   GENERAL: alert and no distress  EYES: Eyes grossly normal to inspection.  No discharge or erythema, or obvious scleral/conjunctival abnormalities.  RESP: No audible wheeze, cough, or visible cyanosis.    SKIN: Visible skin clear. No significant rash, abnormal pigmentation or lesions.  NEURO: Cranial nerves grossly intact.  Mentation and speech appropriate for age.  PSYCH: Appropriate affect, tone, and pace of words          Video-Visit Details    Type of service:  Video Visit     Originating Location (pt. Location):  Home    Distant Location (provider location):  On-site  Platform used for Video Visit: Tamara  Signed Electronically by: Crystal Miramontes MD

## 2024-02-22 ENCOUNTER — TELEPHONE (OUTPATIENT)
Dept: GASTROENTEROLOGY | Facility: CLINIC | Age: 49
End: 2024-02-22
Payer: COMMERCIAL

## 2024-02-22 NOTE — TELEPHONE ENCOUNTER
"Endoscopy Scheduling Screen    Have you had a positive Covid test in the last 14 days?  No    Are you active on MyChart?   Yes    What insurance is in the chart?  Other:      Ordering/Referring Provider:     STAN SOLIS      (If ordering provider performs procedure, schedule with ordering provider unless otherwise instructed. )    BMI: Estimated body mass index is 32.79 kg/m  as calculated from the following:    Height as of 2/5/21: 1.88 m (6' 2\").    Weight as of 4/28/21: 115.8 kg (255 lb 6.4 oz).     Sedation Ordered  moderate sedation.   If patient BMI > 50 do not schedule in [facility].    If patient BMI > 45 do not schedule at [facility].    Are you taking methadone or Suboxone?  No    Have you had difficulties, pain, or discomfort during past endoscopy procedures?  No    Are you taking any prescription medications for pain 3 or more times per week?   NO - No RN review required.    Do you have a history of malignant hyperthermia or adverse reaction to anesthesia?  No    (Females) Are you currently pregnant?   No     Have you been diagnosed or told you have pulmonary hypertension?   No    Do you have an LVAD?  No    Have you been told you have moderate to severe sleep apnea?  No    Have you been told you have COPD, asthma, or any other lung disease?  No    Do you have any heart conditions?  No     Have you ever had an organ transplant?   No    Have you ever had or are you awaiting a heart or lung transplant?   No    Have you had a stroke or transient ischemic attack (TIA aka \"mini stroke\" in the last 6 months?   No    Have you been diagnosed with or been told you have cirrhosis of the liver?   No    Are you currently on dialysis?   No    Do you need assistance transferring?   No    BMI: Estimated body mass index is 32.79 kg/m  as calculated from the following:    Height as of 2/5/21: 1.88 m (6' 2\").    Weight as of 4/28/21: 115.8 kg (255 lb 6.4 oz).     Is patients BMI > 40 and scheduling location [facility]?  No    Do you " take an injectable medication for weight loss or diabetes (excluding insulin)?  No    Do you take the medication Naltrexone?  No    Do you take blood thinners?  No       Prep   Are you currently on dialysis or do you have chronic kidney disease?  No    Do you have a diagnosis of diabetes?  No    Do you have a diagnosis of cystic fibrosis (CF)?  No    On a regular basis do you go 3 -5 days between bowel movements?  No    BMI > 40?  No    Preferred Pharmacy:    [id number] [address] [address]  Phone: [phone] Fax: [phone]      Final Scheduling Details   Colonoscopy prep sent?  N/A    Procedure scheduled  Colonoscopy    Surgeon:  LISSY     Date of procedure:  05/22/2024     Pre-OP / PAC:   No - Not required for this site.    Location  [facility] - Per order.    Sedation   Moderate Sedation - Patient preference.      Patient Reminders:   You will receive a call from a Nurse to review instructions and health history.  This assessment must be completed prior to your procedure.  Failure to complete the Nurse assessment may result in the procedure being cancelled.      On the day of your procedure, please designate an adult(s) who can drive you home stay with you for the next 24 hours. The medicines used in the exam will make you sleepy. You will not be able to drive.      You cannot take public transportation, ride share services, or non-medical taxi service without a responsible caregiver.  Medical transport services are allowed with the requirement that a responsible caregiver will receive you at your destination.  We require that drivers and caregivers are confirmed prior to your procedure.

## 2024-05-10 ENCOUNTER — TELEPHONE (OUTPATIENT)
Dept: GASTROENTEROLOGY | Facility: CLINIC | Age: 49
End: 2024-05-10
Payer: COMMERCIAL

## 2024-05-10 NOTE — TELEPHONE ENCOUNTER
Pre visit planning completed.      Procedure details:    Patient scheduled for Colonoscopy  on 05.22.2024.     Arrival time: 1215. Procedure time 1300    Facility location[facility]; [place] [address]. Check in location: Main entrance at . Come through the roundabout underneath the awning (not the ER entrance).    Sedation type: Conscious sedation     Pre op exam needed? N/A    Indication for procedure:     Chart review:     Electronic implanted devices? No    Recent diagnosis of diverticulitis within the last 6 weeks? No    Diabetic? No      Medication review:    Anticoagulants? No    NSAIDS? No NSAID medications per patient's medication list.  RN will verify with pre-assessment call.    Other medication HOLDING recommendations:  N/A      Prep for procedure:     Bowel prep recommendation: Standard Miralax  Due to: standard bowel prep.    Prep instructions sent via Mobim         Karolina Sims RN  Endoscopy Procedure Pre Assessment RN  [phone] option 4

## 2024-05-13 NOTE — TELEPHONE ENCOUNTER
Attempted to contact patient in order to complete pre assessment questions. Pre visit planning completed below.     No answer. Left message to return call to [phone] option 4. MyChart message sent.        Temitope Campoverde RN  Endoscopy Procedure Pre Assessment RN

## 2024-05-14 NOTE — TELEPHONE ENCOUNTER
Pre assessment completed for upcoming procedure.   (Please see previous telephone encounter notes for complete details)    Patient  returned call.       Procedure details:    Arrival time and facility location reviewed.    Pre op exam needed? N/A    Designated  policy reviewed. Instructed to have someone stay 6  hours post procedure.       Medication review:    Medications reviewed. Please see supporting documentation below. Holding recommendations discussed (if applicable).   NSAID medication(s): Ibuprofen (Advil, Motrin): HOLD 1 day before procedure.      Prep for procedure:     Procedure prep instructions NOT reviewed.  Patient stated they had read the prep and had it printed out. Patient aware dietary changes start tomorrow and had no questions about any dietary changes or how to take prep. Reiterated NPO time.       Any additional information needed:  N/A      Patient  verbalized understanding and had no questions or concerns at this time.      Temitope Campoverde RN  Endoscopy Procedure Pre Assessment RN  [phone] option 4

## 2024-05-22 ENCOUNTER — HOSPITAL ENCOUNTER (OUTPATIENT)
Facility: CLINIC | Age: 49
Discharge: HOME | End: 2024-05-22
Attending: INTERNAL MEDICINE | Admitting: INTERNAL MEDICINE
Payer: COMMERCIAL

## 2024-05-22 VITALS
RESPIRATION RATE: 20 BRPM | OXYGEN SATURATION: 95 % | WEIGHT: 245 LBS | SYSTOLIC BLOOD PRESSURE: 122 MMHG | HEIGHT: 74 IN | HEART RATE: 77 BPM | DIASTOLIC BLOOD PRESSURE: 82 MMHG | BODY MASS INDEX: 31.44 KG/M2

## 2024-05-22 LAB — COLONOSCOPY: NORMAL

## 2024-05-22 PROCEDURE — 250N000011 HC RX IP 250 OP 636: Performed by: INTERNAL MEDICINE

## 2024-05-22 PROCEDURE — G0500 MOD SEDAT ENDO SERVICE >5YRS: HCPCS | Performed by: INTERNAL MEDICINE

## 2024-05-22 PROCEDURE — 45378 DIAGNOSTIC COLONOSCOPY: CPT | Performed by: INTERNAL MEDICINE

## 2024-05-22 PROCEDURE — G0121 COLON CA SCRN NOT HI RSK IND: HCPCS | Performed by: INTERNAL MEDICINE

## 2024-05-22 RX ORDER — ONDANSETRON 2 MG/ML
4 INJECTION INTRAMUSCULAR; INTRAVENOUS
Status: DISCONTINUED | OUTPATIENT
Start: 2024-05-22 | End: 2024-05-22 | Stop reason: HOSPADM

## 2024-05-22 RX ORDER — EPINEPHRINE 1 MG/ML
0.1 INJECTION, SOLUTION INTRAMUSCULAR; SUBCUTANEOUS
Status: DISCONTINUED | OUTPATIENT
Start: 2024-05-22 | End: 2024-05-22 | Stop reason: HOSPADM

## 2024-05-22 RX ORDER — FENTANYL CITRATE 50 UG/ML
50-100 INJECTION, SOLUTION INTRAMUSCULAR; INTRAVENOUS EVERY 5 MIN PRN
Status: DISCONTINUED | OUTPATIENT
Start: 2024-05-22 | End: 2024-05-22 | Stop reason: HOSPADM

## 2024-05-22 RX ORDER — NALOXONE HYDROCHLORIDE 0.4 MG/ML
0.2 INJECTION, SOLUTION INTRAMUSCULAR; INTRAVENOUS; SUBCUTANEOUS
Status: DISCONTINUED | OUTPATIENT
Start: 2024-05-22 | End: 2024-05-22 | Stop reason: HOSPADM

## 2024-05-22 RX ORDER — ONDANSETRON 2 MG/ML
4 INJECTION INTRAMUSCULAR; INTRAVENOUS EVERY 6 HOURS PRN
Status: DISCONTINUED | OUTPATIENT
Start: 2024-05-22 | End: 2024-05-22 | Stop reason: HOSPADM

## 2024-05-22 RX ORDER — FLUMAZENIL 0.1 MG/ML
0.2 INJECTION, SOLUTION INTRAVENOUS
Status: DISCONTINUED | OUTPATIENT
Start: 2024-05-22 | End: 2024-05-22 | Stop reason: HOSPADM

## 2024-05-22 RX ORDER — NALOXONE HYDROCHLORIDE 0.4 MG/ML
0.4 INJECTION, SOLUTION INTRAMUSCULAR; INTRAVENOUS; SUBCUTANEOUS
Status: DISCONTINUED | OUTPATIENT
Start: 2024-05-22 | End: 2024-05-22 | Stop reason: HOSPADM

## 2024-05-22 RX ORDER — DIPHENHYDRAMINE HYDROCHLORIDE 50 MG/ML
25-50 INJECTION INTRAMUSCULAR; INTRAVENOUS
Status: DISCONTINUED | OUTPATIENT
Start: 2024-05-22 | End: 2024-05-22 | Stop reason: HOSPADM

## 2024-05-22 RX ORDER — LIDOCAINE 40 MG/G
CREAM TOPICAL
Status: DISCONTINUED | OUTPATIENT
Start: 2024-05-22 | End: 2024-05-22 | Stop reason: HOSPADM

## 2024-05-22 RX ORDER — SIMETHICONE 40MG/0.6ML
133 SUSPENSION, DROPS(FINAL DOSAGE FORM)(ML) ORAL
Status: DISCONTINUED | OUTPATIENT
Start: 2024-05-22 | End: 2024-05-22 | Stop reason: HOSPADM

## 2024-05-22 RX ORDER — ATROPINE SULFATE 0.1 MG/ML
1 INJECTION INTRAVENOUS
Status: DISCONTINUED | OUTPATIENT
Start: 2024-05-22 | End: 2024-05-22 | Stop reason: HOSPADM

## 2024-05-22 RX ORDER — ONDANSETRON 4 MG/1
4 TABLET, ORALLY DISINTEGRATING ORAL EVERY 6 HOURS PRN
Status: DISCONTINUED | OUTPATIENT
Start: 2024-05-22 | End: 2024-05-22 | Stop reason: HOSPADM

## 2024-05-22 RX ORDER — PROCHLORPERAZINE MALEATE 10 MG
10 TABLET ORAL EVERY 6 HOURS PRN
Status: DISCONTINUED | OUTPATIENT
Start: 2024-05-22 | End: 2024-05-22 | Stop reason: HOSPADM

## 2024-05-22 RX ADMIN — MIDAZOLAM 2 MG: 1 INJECTION INTRAMUSCULAR; INTRAVENOUS at 12:39

## 2024-05-22 RX ADMIN — FENTANYL CITRATE 100 MCG: 50 INJECTION, SOLUTION INTRAMUSCULAR; INTRAVENOUS at 12:39

## 2024-05-22 ASSESSMENT — ACTIVITIES OF DAILY LIVING (ADL): ADLS_ACUITY_SCORE: 35

## 2024-05-22 NOTE — H&P
Pre-Endoscopy History and Physical     Wilbur Aguilar MRN# [id number]   YOB: 1975 Age: 49 year old     Date of Procedure: 2024  Primary care provider: Nancy - CORINNE [facility]  Type of Endoscopy: Colonoscopy with possible biopsy, possible polypectomy  Reason for Procedure: screen  Type of Anesthesia Anticipated: Conscious Sedation    HPI:    Wilbur is a 49 year old male who will be undergoing the above procedure.      A history and physical has been performed. The patient's medications and allergies have been reviewed. The risks and benefits of the procedure and the sedation options and risks were discussed with the patient.  All questions were answered and informed consent was obtained.      He denies a personal or family history of anesthesia complications or bleeding disorders.     Patient Active Problem List   Diagnosis    CARDIOVASCULAR SCREENING; LDL GOAL LESS THAN 160    Generalized anxiety disorder    Primary insomnia        No past medical history on file.     Past Surgical History:   Procedure Laterality Date    AS KNEE SCOPE,MED/LAT MENISCUS REPAIR Left 2019    ZZC NONSPECIFIC PROCEDURE      T + A as child        Social History     Tobacco Use    Smoking status: Former     Current packs/day: 0.00     Types: Cigarettes     Quit date: 2001     Years since quittin.4    Smokeless tobacco: Former    Tobacco comments:     chews 1-2 cans every 3 days   Substance Use Topics    Alcohol use: Yes     Comment: rarely       Family History   Problem Relation Age of Onset    Breast Cancer Mother     Cancer Mother         lymphoma    Other Cancer Mother     Diabetes Father         adult onset type 2    Cancer Paternal Grandfather     Other Cancer Paternal Grandfather     Diabetes Paternal Grandmother     Hypertension Maternal Grandmother     Other Cancer Maternal Grandfather        Prior to Admission medications    Medication Sig Start Date End Date Taking? Authorizing  "Provider   escitalopram (LEXAPRO) 10 MG tablet Take 1 tablet (10 mg) by mouth daily 2/2/24   Crystal Miramontes MD   MULTIVITAMIN TABS   OR 1 TABLET DAILY    Reported, Patient   Omega-3 Fatty Acids (FISH OIL PO)     Reported, Patient   Probiotic Product (PROBIOTIC PO)     Reported, Patient   traZODone (DESYREL) 50 MG tablet Take 1-2 tablets ( mg) by mouth at bedtime 1/5/24   Crystal Miramontes MD       No Known Allergies     REVIEW OF SYSTEMS:   5 point ROS negative except as noted above in HPI, including Gen., Resp., CV, GI &  system review.    PHYSICAL EXAM:   There were no vitals taken for this visit. Estimated body mass index is 32.79 kg/m  as calculated from the following:    Height as of 2/5/21: 1.88 m (6' 2\").    Weight as of 4/28/21: 115.8 kg (255 lb 6.4 oz).   GENERAL APPEARANCE: alert, and oriented  MENTAL STATUS: alert  AIRWAY EXAM: Mallampatti Class I (visualization of the soft palate, fauces, uvula, anterior and posterior pillars)  RESP: lungs clear to auscultation - no rales, rhonchi or wheezes  CV: regular rates and rhythm  DIAGNOSTICS:    Not indicated    IMPRESSION   ASA Class 1 - Healthy patient, no medical problems    PLAN:   Plan for Colonoscopy with possible biopsy, possible polypectomy. We discussed the risks, benefits and alternatives and the patient wished to proceed.    The above has been forwarded to the consulting provider.      Signed Electronically by: Vicente Acosta MD  May 22, 2024          "

## 2024-06-02 ENCOUNTER — HEALTH MAINTENANCE LETTER (OUTPATIENT)
Age: 49
End: 2024-06-02

## 2024-07-09 SDOH — HEALTH STABILITY: PHYSICAL HEALTH: ON AVERAGE, HOW MANY DAYS PER WEEK DO YOU ENGAGE IN MODERATE TO STRENUOUS EXERCISE (LIKE A BRISK WALK)?: 4 DAYS

## 2024-07-09 SDOH — HEALTH STABILITY: PHYSICAL HEALTH: ON AVERAGE, HOW MANY MINUTES DO YOU ENGAGE IN EXERCISE AT THIS LEVEL?: 60 MIN

## 2024-07-09 ASSESSMENT — SOCIAL DETERMINANTS OF HEALTH (SDOH): HOW OFTEN DO YOU GET TOGETHER WITH FRIENDS OR RELATIVES?: TWICE A WEEK

## 2024-07-10 ENCOUNTER — OFFICE VISIT (OUTPATIENT)
Dept: FAMILY MEDICINE | Facility: CLINIC | Age: 49
End: 2024-07-10
Payer: COMMERCIAL

## 2024-07-10 VITALS
TEMPERATURE: 98.3 F | SYSTOLIC BLOOD PRESSURE: 125 MMHG | HEIGHT: 74 IN | OXYGEN SATURATION: 94 % | DIASTOLIC BLOOD PRESSURE: 84 MMHG | WEIGHT: 258.6 LBS | BODY MASS INDEX: 33.19 KG/M2 | HEART RATE: 79 BPM

## 2024-07-10 DIAGNOSIS — K43.9 VENTRAL HERNIA WITHOUT OBSTRUCTION OR GANGRENE: ICD-10-CM

## 2024-07-10 DIAGNOSIS — Z00.00 ROUTINE GENERAL MEDICAL EXAMINATION AT A HEALTH CARE FACILITY: Primary | ICD-10-CM

## 2024-07-10 LAB
ALBUMIN SERPL BCG-MCNC: 4.4 G/DL (ref 3.5–5.2)
ALP SERPL-CCNC: 75 U/L (ref 40–150)
ALT SERPL W P-5'-P-CCNC: 36 U/L (ref 0–70)
ANION GAP SERPL CALCULATED.3IONS-SCNC: 6 MMOL/L (ref 7–15)
AST SERPL W P-5'-P-CCNC: 24 U/L (ref 0–45)
BILIRUB SERPL-MCNC: 0.6 MG/DL
BUN SERPL-MCNC: 17.8 MG/DL (ref 6–20)
CALCIUM SERPL-MCNC: 8.9 MG/DL (ref 8.6–10)
CHLORIDE SERPL-SCNC: 106 MMOL/L (ref 98–107)
CREAT SERPL-MCNC: 0.95 MG/DL (ref 0.67–1.17)
DEPRECATED HCO3 PLAS-SCNC: 28 MMOL/L (ref 22–29)
EGFRCR SERPLBLD CKD-EPI 2021: >90 ML/MIN/1.73M2
GLUCOSE SERPL-MCNC: 110 MG/DL (ref 70–99)
HBV CORE AB SERPL QL IA: NONREACTIVE
HBV SURFACE AB SERPL IA-ACNC: <3.5 M[IU]/ML
HBV SURFACE AB SERPL IA-ACNC: NONREACTIVE M[IU]/ML
HBV SURFACE AG SERPL QL IA: NONREACTIVE
POTASSIUM SERPL-SCNC: 4.8 MMOL/L (ref 3.4–5.3)
PROT SERPL-MCNC: 7 G/DL (ref 6.4–8.3)
SODIUM SERPL-SCNC: 140 MMOL/L (ref 135–145)

## 2024-07-10 PROCEDURE — 36415 COLL VENOUS BLD VENIPUNCTURE: CPT | Performed by: GENERAL PRACTICE

## 2024-07-10 PROCEDURE — 86706 HEP B SURFACE ANTIBODY: CPT | Performed by: GENERAL PRACTICE

## 2024-07-10 PROCEDURE — 99396 PREV VISIT EST AGE 40-64: CPT | Performed by: GENERAL PRACTICE

## 2024-07-10 PROCEDURE — 86704 HEP B CORE ANTIBODY TOTAL: CPT | Performed by: GENERAL PRACTICE

## 2024-07-10 PROCEDURE — 87340 HEPATITIS B SURFACE AG IA: CPT | Performed by: GENERAL PRACTICE

## 2024-07-10 PROCEDURE — 80053 COMPREHEN METABOLIC PANEL: CPT | Performed by: GENERAL PRACTICE

## 2024-07-10 ASSESSMENT — PAIN SCALES - GENERAL: PAINLEVEL: NO PAIN (0)

## 2024-07-10 NOTE — PATIENT INSTRUCTIONS
Patient Education   Preventive Care Advice   This is general advice given by our system to help you stay healthy. However, your care team may have specific advice just for you. Please talk to your care team about your preventive care needs.  Nutrition  Eat 5 or more servings of fruits and vegetables each day.  Try wheat bread, brown rice and whole grain pasta (instead of white bread, rice, and pasta).  Get enough calcium and vitamin D. Check the label on foods and aim for 100% of the RDA (recommended daily allowance).  Lifestyle  Exercise at least 150 minutes each week  (30 minutes a day, 5 days a week).  Do muscle strengthening activities 2 days a week. These help control your weight and prevent disease.  No smoking.  Wear sunscreen to prevent skin cancer.  Have a dental exam and cleaning every 6 months.  Yearly exams  See your health care team every year to talk about:  Any changes in your health.  Any medicines your care team has prescribed.  Preventive care, family planning, and ways to prevent chronic diseases.  Shots (vaccines)   HPV shots (up to age 26), if you've never had them before.  Hepatitis B shots (up to age 59), if you've never had them before.  COVID-19 shot: Get this shot when it's due.  Flu shot: Get a flu shot every year.  Tetanus shot: Get a tetanus shot every 10 years.  Pneumococcal, hepatitis A, and RSV shots: Ask your care team if you need these based on your risk.  Shingles shot (for age 50 and up)  General health tests  Diabetes screening:  Starting at age 35, Get screened for diabetes at least every 3 years.  If you are younger than age 35, ask your care team if you should be screened for diabetes.  Cholesterol test: At age 39, start having a cholesterol test every 5 years, or more often if advised.  Bone density scan (DEXA): At age 50, ask your care team if you should have this scan for osteoporosis (brittle bones).  Hepatitis C: Get tested at least once in your life.  STIs (sexually  transmitted infections)  Before age 24: Ask your care team if you should be screened for STIs.  After age 24: Get screened for STIs if you're at risk. You are at risk for STIs (including HIV) if:  You are sexually active with more than one person.  You don't use condoms every time.  You or a partner was diagnosed with a sexually transmitted infection.  If you are at risk for HIV, ask about PrEP medicine to prevent HIV.  Get tested for HIV at least once in your life, whether you are at risk for HIV or not.  Cancer screening tests  Cervical cancer screening: If you have a cervix, begin getting regular cervical cancer screening tests starting at age 21.  Breast cancer scan (mammogram): If you've ever had breasts, begin having regular mammograms starting at age 40. This is a scan to check for breast cancer.  Colon cancer screening: It is important to start screening for colon cancer at age 45.  Have a colonoscopy test every 10 years (or more often if you're at risk) Or, ask your provider about stool tests like a FIT test every year or Cologuard test every 3 years.  To learn more about your testing options, visit:   .  For help making a decision, visit:   https://bit.ly/ia68148.  Prostate cancer screening test: If you have a prostate, ask your care team if a prostate cancer screening test (PSA) at age 55 is right for you.  Lung cancer screening: If you are a current or former smoker ages 50 to 80, ask your care team if ongoing lung cancer screenings are right for you.  For informational purposes only. Not to replace the advice of your health care provider. Copyright   2023 [employer]. All rights reserved. Clinically reviewed by the [facility] Transitions Program. [employer] 779583 - REV 01/24.

## 2024-07-10 NOTE — PROGRESS NOTES
"Preventive Care Visit  [facility] UCHE Thao MD, Internal Medicine  Jul 10, 2024      Assessment & Plan     Routine general medical examination at a health care facility  Discussed last fasting lipid and is ok to check next time as he is not fasting today.  UTD with screening colonoscopy  Denies risks for STDs    - Comprehensive metabolic panel (BMP + Alb, Alk Phos, ALT, AST, Total. Bili, TP); Future  - Hepatitis B Surface Antibody; Future  - Hepatitis B surface antigen; Future  - Hepatitis B core antibody; Future  - Comprehensive metabolic panel (BMP + Alb, Alk Phos, ALT, AST, Total. Bili, TP)  - Hepatitis B Surface Antibody  - Hepatitis B surface antigen  - Hepatitis B core antibody    Ventral hernia without obstruction or gangrene  Asymptomatic   Patient is ok to monitor for worsening symptom            BMI  Estimated body mass index is 33.2 kg/m  as calculated from the following:    Height as of this encounter: 1.88 m (6' 2\").    Weight as of this encounter: 117.3 kg (258 lb 9.6 oz).       Counseling  Appropriate preventive services were discussed with this patient, including applicable screening as appropriate for fall prevention, nutrition, physical activity, Tobacco-use cessation, weight loss and cognition.  Checklist reviewing preventive services available has been given to the patient.  Reviewed patient's diet, addressing concerns and/or questions.   He is at risk for psychosocial distress and has been provided with information to reduce risk.           Jaimie Bowles is a 49 year old, presenting for the following:  Physical        7/10/2024     7:18 AM   Additional Questions   Roomed by Claudia Mcqueen ()   Accompanied by self         7/10/2024     7:18 AM   Patient Reported Additional Medications   Patient reports taking the following new medications none        Health Care Directive  Patient does not have a Health Care Directive or Living Will: Discussed advance care " planning with patient; however, patient declined at this time.    Physical Exam    Possible hernia                    2024   General Health   How would you rate your overall physical health? Excellent   Feel stress (tense, anxious, or unable to sleep) Only a little      (!) STRESS CONCERN      2024   Nutrition   Three or more servings of calcium each day? Yes   Diet: Regular (no restrictions)   How many servings of fruit and vegetables per day? (!) 2-3   How many sweetened beverages each day? 0-1            2024   Exercise   Days per week of moderate/strenous exercise 4 days   Average minutes spent exercising at this level 60 min            2024   Social Factors   Frequency of gathering with friends or relatives Twice a week   Worry food won't last until get money to buy more No   Food not last or not have enough money for food? No   Do you have housing? (Housing is defined as stable permanent housing and does not include staying ouside in a car, in a tent, in an abandoned building, in an overnight shelter, or couch-surfing.) Yes   Are you worried about losing your housing? No   Lack of transportation? No   Unable to get utilities (heat,electricity)? No            2024   Fall Risk   Fallen 2 or more times in the past year? No   Trouble with walking or balance? No             2024   Dental   Dentist two times every year? Yes            2024   TB Screening   Were you born outside of the [place]? No                  2024   Substance Use   Alcohol more than 3/day or more than 7/wk No   Do you use any other substances recreationally? No        Social History     Tobacco Use    Smoking status: Former     Current packs/day: 0.00     Average packs/day: 0.5 packs/day for 12.0 years (6.0 ttl pk-yrs)     Types: Cigarettes     Start date: 1989     Quit date: 2002     Years since quittin.5    Smokeless tobacco: Former     Quit date: 4/15/2016    Tobacco comments:     chews 1-2 cans every 3  "days   Vaping Use    Vaping status: Never Used   Substance Use Topics    Alcohol use: Not Currently     Comment: rarely    Drug use: No           7/9/2024   STI Screening   New sexual partner(s) since last STI/HIV test? No      ASCVD Risk   The 10-year ASCVD risk score (Nakita SULLIVAN, et al., 2019) is: 2.2%    Values used to calculate the score:      Age: 49 years      Sex: Male      Is Non- : No      Diabetic: No      Tobacco smoker: No      Systolic Blood Pressure: 125 mmHg      Is BP treated: No      HDL Cholesterol: 54 mg/dL      Total Cholesterol: 147 mg/dL           Reviewed and updated as needed this visit by Provider                          Review of Systems  Constitutional, HEENT, cardiovascular, pulmonary, GI, , musculoskeletal, neuro, skin, endocrine and psych systems are negative, except as otherwise noted.     Objective    Exam  /84 (BP Location: Right arm, Patient Position: Sitting, Cuff Size: Adult Large)   Pulse 79   Temp 98.3  F (36.8  C)   Ht 1.88 m (6' 2\")   Wt 117.3 kg (258 lb 9.6 oz)   SpO2 94%   BMI 33.20 kg/m     Estimated body mass index is 33.2 kg/m  as calculated from the following:    Height as of this encounter: 1.88 m (6' 2\").    Weight as of this encounter: 117.3 kg (258 lb 9.6 oz).    Physical Exam  Constitutional:       Appearance: Normal appearance.   HENT:      Head: Normocephalic and atraumatic.      Nose: Nose normal.      Mouth/Throat:      Mouth: Mucous membranes are moist.      Pharynx: Oropharynx is clear.   Eyes:      Extraocular Movements: Extraocular movements intact.      Conjunctiva/sclera: Conjunctivae normal.   Cardiovascular:      Rate and Rhythm: Normal rate and regular rhythm.      Heart sounds: Normal heart sounds.   Pulmonary:      Effort: Pulmonary effort is normal.      Breath sounds: Normal breath sounds.   Abdominal:      General: Abdomen is flat.      Palpations: Abdomen is soft.   Musculoskeletal:         General: " Normal range of motion.      Cervical back: Normal range of motion and neck supple.   Skin:     General: Skin is warm.   Neurological:      General: No focal deficit present.      Mental Status: He is alert and oriented to person, place, and time. Mental status is at baseline.   Psychiatric:         Mood and Affect: Mood and affect normal.         Speech: Speech normal.         Behavior: Behavior normal. Behavior is cooperative.         Thought Content: Thought content normal.         Cognition and Memory: Cognition normal.         Judgment: Judgment normal.               Signed Electronically by: Bhavya Thao MD

## 2025-06-10 ENCOUNTER — PATIENT OUTREACH (OUTPATIENT)
Dept: CARE COORDINATION | Facility: CLINIC | Age: 50
End: 2025-06-10
Payer: COMMERCIAL

## 2025-08-16 ENCOUNTER — HEALTH MAINTENANCE LETTER (OUTPATIENT)
Age: 50
End: 2025-08-16

## (undated) DEVICE — KIT ENDO TURNOVER/PROCEDURE W/CLEAN A SCOPE LINERS 103888

## (undated) RX ORDER — FENTANYL CITRATE 50 UG/ML
INJECTION, SOLUTION INTRAMUSCULAR; INTRAVENOUS
Status: DISPENSED
Start: 2024-05-22